# Patient Record
Sex: FEMALE | Race: WHITE | NOT HISPANIC OR LATINO | Employment: OTHER | URBAN - METROPOLITAN AREA
[De-identification: names, ages, dates, MRNs, and addresses within clinical notes are randomized per-mention and may not be internally consistent; named-entity substitution may affect disease eponyms.]

---

## 2015-02-25 LAB — CRC RECOMMENDATION EXT: NORMAL

## 2017-03-27 ENCOUNTER — HISTORICAL (OUTPATIENT)
Dept: LAB | Facility: HOSPITAL | Age: 67
End: 2017-03-27

## 2017-05-11 ENCOUNTER — HISTORICAL (OUTPATIENT)
Dept: ADMINISTRATIVE | Facility: HOSPITAL | Age: 67
End: 2017-05-11

## 2017-05-11 LAB
ABS NEUT (OLG): 2.23 X10(3)/MCL (ref 2.1–9.2)
BASOPHILS # BLD AUTO: 0 X10(3)/MCL (ref 0–0.2)
BASOPHILS NFR BLD AUTO: 1 %
EOSINOPHIL # BLD AUTO: 0.1 X10(3)/MCL (ref 0–0.9)
EOSINOPHIL NFR BLD AUTO: 2.4 %
ERYTHROCYTE [DISTWIDTH] IN BLOOD BY AUTOMATED COUNT: 13.7 % (ref 11.5–17)
HCT VFR BLD AUTO: 43.9 % (ref 37–47)
HGB BLD-MCNC: 14.6 GM/DL (ref 12–16)
LYMPHOCYTES # BLD AUTO: 1.3 X10(3)/MCL (ref 0.6–4.6)
LYMPHOCYTES NFR BLD AUTO: 31 %
MCH RBC QN AUTO: 29.1 PG (ref 27–31)
MCHC RBC AUTO-ENTMCNC: 33.3 GM/DL (ref 33–36)
MCV RBC AUTO: 87.6 FL (ref 80–94)
MONOCYTES # BLD AUTO: 0.5 X10(3)/MCL (ref 0.1–1.3)
MONOCYTES NFR BLD AUTO: 11.6 %
NEUTROPHILS # BLD AUTO: 2.2 X10(3)/MCL (ref 2.1–9.2)
NEUTROPHILS NFR BLD AUTO: 54 %
PLATELET # BLD AUTO: 219 X10(3)/MCL (ref 130–400)
PMV BLD AUTO: 11 FL (ref 9.4–12.4)
RBC # BLD AUTO: 5.01 X10(6)/MCL (ref 4.2–5.4)
WBC # SPEC AUTO: 4.1 X10(3)/MCL (ref 4.5–11.5)

## 2017-09-21 ENCOUNTER — HISTORICAL (OUTPATIENT)
Dept: LAB | Facility: HOSPITAL | Age: 67
End: 2017-09-21

## 2017-09-21 LAB
ABS NEUT (OLG): 1.86 X10(3)/MCL (ref 2.1–9.2)
ACANTHOCYTES (OLG): 0
ALBUMIN SERPL-MCNC: 4.4 GM/DL (ref 3.4–5)
ALBUMIN/GLOB SERPL: 1.6 RATIO (ref 1.1–2)
ALP SERPL-CCNC: 63 UNIT/L (ref 38–126)
ALT SERPL-CCNC: 27 UNIT/L (ref 12–78)
AST SERPL-CCNC: 16 UNIT/L (ref 15–37)
BASOPHILS NFR BLD MANUAL: 1 % (ref 0–2)
BILIRUB SERPL-MCNC: 0.5 MG/DL (ref 0.2–1)
BILIRUBIN DIRECT+TOT PNL SERPL-MCNC: 0.1 MG/DL (ref 0–0.5)
BILIRUBIN DIRECT+TOT PNL SERPL-MCNC: 0.4 MG/DL (ref 0–0.8)
BUN SERPL-MCNC: 10 MG/DL (ref 7–18)
BURR CELLS BLD QL SMEAR: 0
CALCIUM SERPL-MCNC: 9.3 MG/DL (ref 8.5–10.1)
CHLORIDE SERPL-SCNC: 107 MMOL/L (ref 98–107)
CHOLEST SERPL-MCNC: 176 MG/DL (ref 0–200)
CHOLEST/HDLC SERPL: 3.2 {RATIO} (ref 0–4)
CO2 SERPL-SCNC: 24 MMOL/L (ref 21–32)
CREAT SERPL-MCNC: 0.67 MG/DL (ref 0.55–1.02)
EOSINOPHIL NFR BLD MANUAL: 3 % (ref 0–8)
ERYTHROCYTE [DISTWIDTH] IN BLOOD BY AUTOMATED COUNT: 13.3 % (ref 11.5–17)
GLOBULIN SER-MCNC: 2.7 GM/DL (ref 2.4–3.5)
GLUCOSE SERPL-MCNC: 80 MG/DL (ref 74–106)
HCT VFR BLD AUTO: 45.4 % (ref 37–47)
HDLC SERPL-MCNC: 55 MG/DL (ref 35–60)
HGB BLD-MCNC: 15.7 GM/DL (ref 12–16)
LDLC SERPL CALC-MCNC: 90 MG/DL (ref 0–129)
LYMPHOCYTES NFR BLD MANUAL: 14 %
LYMPHOCYTES NFR BLD MANUAL: 20 % (ref 13–40)
MCH RBC QN AUTO: 30.3 PG (ref 27–31)
MCHC RBC AUTO-ENTMCNC: 34.6 GM/DL (ref 33–36)
MCV RBC AUTO: 87.6 FL (ref 80–94)
MONOCYTES NFR BLD MANUAL: 9 % (ref 2–11)
NEUTROPHILS NFR BLD MANUAL: 53 % (ref 47–80)
OVALOCYTES BLD QL SMEAR: 0
PLATELET # BLD AUTO: 219 X10(3)/MCL (ref 130–400)
PLATELET # BLD EST: NORMAL 10*3/UL
PMV BLD AUTO: 11.3 FL (ref 7.4–10.4)
POTASSIUM SERPL-SCNC: 4.1 MMOL/L (ref 3.5–5.1)
PROT SERPL-MCNC: 7.1 GM/DL (ref 6.4–8.2)
RBC # BLD AUTO: 5.18 X10(6)/MCL (ref 4.2–5.4)
SODIUM SERPL-SCNC: 142 MMOL/L (ref 136–145)
TRIGL SERPL-MCNC: 156 MG/DL (ref 30–150)
VLDLC SERPL CALC-MCNC: 31 MG/DL
WBC # SPEC AUTO: 3.6 X10(3)/MCL (ref 4.5–11.5)

## 2018-03-21 ENCOUNTER — HISTORICAL (OUTPATIENT)
Dept: LAB | Facility: HOSPITAL | Age: 68
End: 2018-03-21

## 2018-03-21 LAB
ABS NEUT (OLG): 1.93 X10(3)/MCL (ref 2.1–9.2)
ALBUMIN SERPL-MCNC: 4.3 GM/DL (ref 3.4–5)
ALBUMIN/GLOB SERPL: 1.6 {RATIO}
ALP SERPL-CCNC: 64 UNIT/L (ref 38–126)
ALT SERPL-CCNC: 25 UNIT/L (ref 12–78)
ANISOCYTOSIS BLD QL SMEAR: 1
AST SERPL-CCNC: 10 UNIT/L (ref 15–37)
BASOPHILS NFR BLD MANUAL: 3 % (ref 0–2)
BILIRUB SERPL-MCNC: 0.5 MG/DL (ref 0.2–1)
BILIRUBIN DIRECT+TOT PNL SERPL-MCNC: 0.1 MG/DL (ref 0–0.2)
BILIRUBIN DIRECT+TOT PNL SERPL-MCNC: 0.4 MG/DL (ref 0–0.8)
BUN SERPL-MCNC: 10 MG/DL (ref 7–18)
CALCIUM SERPL-MCNC: 9 MG/DL (ref 8.5–10.1)
CHLORIDE SERPL-SCNC: 107 MMOL/L (ref 98–107)
CHOLEST SERPL-MCNC: 175 MG/DL (ref 0–200)
CHOLEST/HDLC SERPL: 2.9 {RATIO} (ref 0–4)
CO2 SERPL-SCNC: 25 MMOL/L (ref 21–32)
CREAT SERPL-MCNC: 0.56 MG/DL (ref 0.55–1.02)
EOSINOPHIL NFR BLD MANUAL: 4 % (ref 0–8)
ERYTHROCYTE [DISTWIDTH] IN BLOOD BY AUTOMATED COUNT: 13.3 % (ref 11.5–17)
GLOBULIN SER-MCNC: 2.7 GM/DL (ref 2.4–3.5)
GLUCOSE SERPL-MCNC: 88 MG/DL (ref 74–106)
HCT VFR BLD AUTO: 44.4 % (ref 37–47)
HDLC SERPL-MCNC: 61 MG/DL (ref 35–60)
HGB BLD-MCNC: 15 GM/DL (ref 12–16)
LDLC SERPL CALC-MCNC: 89 MG/DL (ref 0–129)
LYMPHOCYTES NFR BLD MANUAL: 35 % (ref 13–40)
MCH RBC QN AUTO: 28.8 PG (ref 27–31)
MCHC RBC AUTO-ENTMCNC: 33.8 GM/DL (ref 33–36)
MCV RBC AUTO: 85.4 FL (ref 80–94)
MICROCYTES BLD QL SMEAR: 1
MONOCYTES NFR BLD MANUAL: 5 % (ref 2–11)
NEUTROPHILS NFR BLD MANUAL: 53 % (ref 47–80)
PLATELET # BLD AUTO: 236 X10(3)/MCL (ref 130–400)
PLATELET # BLD EST: NORMAL 10*3/UL
PMV BLD AUTO: 11.4 FL (ref 7.4–10.4)
POTASSIUM SERPL-SCNC: 3.9 MMOL/L (ref 3.5–5.1)
PROT SERPL-MCNC: 7 GM/DL (ref 6.4–8.2)
RBC # BLD AUTO: 5.2 X10(6)/MCL (ref 4.2–5.4)
SODIUM SERPL-SCNC: 139 MMOL/L (ref 136–145)
TRIGL SERPL-MCNC: 124 MG/DL (ref 30–150)
VLDLC SERPL CALC-MCNC: 25 MG/DL
WBC # SPEC AUTO: 3.9 X10(3)/MCL (ref 4.5–11.5)

## 2018-09-21 ENCOUNTER — HISTORICAL (OUTPATIENT)
Dept: LAB | Facility: HOSPITAL | Age: 68
End: 2018-09-21

## 2018-09-21 LAB
ABS NEUT (OLG): 1.89 X10(3)/MCL (ref 2.1–9.2)
BASOPHILS # BLD AUTO: 0 X10(3)/MCL (ref 0–0.2)
BASOPHILS NFR BLD AUTO: 1 %
BUN SERPL-MCNC: 11 MG/DL (ref 7–18)
CALCIUM SERPL-MCNC: 9.3 MG/DL (ref 8.5–10.1)
CHLORIDE SERPL-SCNC: 107 MMOL/L (ref 98–107)
CHOLEST SERPL-MCNC: 157 MG/DL (ref 0–200)
CHOLEST/HDLC SERPL: 2.6 {RATIO} (ref 0–4)
CO2 SERPL-SCNC: 31 MMOL/L (ref 21–32)
CREAT SERPL-MCNC: 0.68 MG/DL (ref 0.55–1.02)
CREAT/UREA NIT SERPL: 16.2
EOSINOPHIL # BLD AUTO: 0.1 X10(3)/MCL (ref 0–0.9)
EOSINOPHIL NFR BLD AUTO: 2 %
ERYTHROCYTE [DISTWIDTH] IN BLOOD BY AUTOMATED COUNT: 13.7 % (ref 11.5–17)
GLUCOSE SERPL-MCNC: 85 MG/DL (ref 74–106)
HCT VFR BLD AUTO: 46.9 % (ref 37–47)
HDLC SERPL-MCNC: 60 MG/DL (ref 35–60)
HGB BLD-MCNC: 15.3 GM/DL (ref 12–16)
LDLC SERPL CALC-MCNC: 75 MG/DL (ref 0–129)
LYMPHOCYTES # BLD AUTO: 1.3 X10(3)/MCL (ref 0.6–4.6)
LYMPHOCYTES NFR BLD AUTO: 35 %
MCH RBC QN AUTO: 29.3 PG (ref 27–31)
MCHC RBC AUTO-ENTMCNC: 32.6 GM/DL (ref 33–36)
MCV RBC AUTO: 89.8 FL (ref 80–94)
MONOCYTES # BLD AUTO: 0.4 X10(3)/MCL (ref 0.1–1.3)
MONOCYTES NFR BLD AUTO: 12 %
NEUTROPHILS # BLD AUTO: 1.89 X10(3)/MCL (ref 1.4–7.9)
NEUTROPHILS NFR BLD AUTO: 50 %
PLATELET # BLD AUTO: 218 X10(3)/MCL (ref 130–400)
PMV BLD AUTO: 11.2 FL (ref 9.4–12.4)
POTASSIUM SERPL-SCNC: 4.2 MMOL/L (ref 3.5–5.1)
RBC # BLD AUTO: 5.22 X10(6)/MCL (ref 4.2–5.4)
SODIUM SERPL-SCNC: 143 MMOL/L (ref 136–145)
TRIGL SERPL-MCNC: 110 MG/DL (ref 30–150)
VLDLC SERPL CALC-MCNC: 22 MG/DL
WBC # SPEC AUTO: 3.8 X10(3)/MCL (ref 4.5–11.5)

## 2019-09-23 ENCOUNTER — HISTORICAL (OUTPATIENT)
Dept: LAB | Facility: HOSPITAL | Age: 69
End: 2019-09-23

## 2019-09-23 LAB
ABS NEUT (OLG): 4.28 X10(3)/MCL (ref 2.1–9.2)
ALBUMIN SERPL-MCNC: 4.2 GM/DL (ref 3.4–5)
ALBUMIN/GLOB SERPL: 1.6 RATIO (ref 1.1–2)
ALP SERPL-CCNC: 76 UNIT/L (ref 38–126)
ALT SERPL-CCNC: 21 UNIT/L (ref 12–78)
AST SERPL-CCNC: 13 UNIT/L (ref 15–37)
BASOPHILS # BLD AUTO: 0.1 X10(3)/MCL (ref 0–0.2)
BASOPHILS NFR BLD AUTO: 1 %
BILIRUB SERPL-MCNC: 0.5 MG/DL (ref 0.2–1)
BILIRUBIN DIRECT+TOT PNL SERPL-MCNC: 0.1 MG/DL (ref 0–0.5)
BILIRUBIN DIRECT+TOT PNL SERPL-MCNC: 0.4 MG/DL (ref 0–0.8)
BUN SERPL-MCNC: 10 MG/DL (ref 7–18)
CALCIUM SERPL-MCNC: 8.7 MG/DL (ref 8.5–10.1)
CHLORIDE SERPL-SCNC: 107 MMOL/L (ref 98–107)
CHOLEST SERPL-MCNC: 145 MG/DL (ref 0–200)
CHOLEST/HDLC SERPL: 2.5 {RATIO} (ref 0–4)
CO2 SERPL-SCNC: 27 MMOL/L (ref 21–32)
CREAT SERPL-MCNC: 0.6 MG/DL (ref 0.55–1.02)
EOSINOPHIL # BLD AUTO: 0.1 X10(3)/MCL (ref 0–0.9)
EOSINOPHIL NFR BLD AUTO: 2 %
ERYTHROCYTE [DISTWIDTH] IN BLOOD BY AUTOMATED COUNT: 13.1 % (ref 11.5–17)
GLOBULIN SER-MCNC: 2.6 GM/DL (ref 2.4–3.5)
GLUCOSE SERPL-MCNC: 74 MG/DL (ref 74–106)
HCT VFR BLD AUTO: 45.9 % (ref 37–47)
HDLC SERPL-MCNC: 59 MG/DL (ref 35–60)
HGB BLD-MCNC: 14.5 GM/DL (ref 12–16)
LDLC SERPL CALC-MCNC: 64 MG/DL (ref 0–129)
LYMPHOCYTES # BLD AUTO: 1.5 X10(3)/MCL (ref 0.6–4.6)
LYMPHOCYTES NFR BLD AUTO: 23 %
MCH RBC QN AUTO: 28.2 PG (ref 27–31)
MCHC RBC AUTO-ENTMCNC: 31.6 GM/DL (ref 33–36)
MCV RBC AUTO: 89.1 FL (ref 80–94)
MONOCYTES # BLD AUTO: 0.5 X10(3)/MCL (ref 0.1–1.3)
MONOCYTES NFR BLD AUTO: 8 %
NEUTROPHILS # BLD AUTO: 4.28 X10(3)/MCL (ref 2.1–9.2)
NEUTROPHILS NFR BLD AUTO: 65 %
PLATELET # BLD AUTO: 212 X10(3)/MCL (ref 130–400)
PMV BLD AUTO: 12.1 FL (ref 9.4–12.4)
POTASSIUM SERPL-SCNC: 4 MMOL/L (ref 3.5–5.1)
PROT SERPL-MCNC: 6.8 GM/DL (ref 6.4–8.2)
RBC # BLD AUTO: 5.15 X10(6)/MCL (ref 4.2–5.4)
SODIUM SERPL-SCNC: 141 MMOL/L (ref 136–145)
TRIGL SERPL-MCNC: 108 MG/DL (ref 30–150)
VLDLC SERPL CALC-MCNC: 22 MG/DL
WBC # SPEC AUTO: 6.6 X10(3)/MCL (ref 4.5–11.5)

## 2020-09-28 ENCOUNTER — HISTORICAL (OUTPATIENT)
Dept: ADMINISTRATIVE | Facility: HOSPITAL | Age: 70
End: 2020-09-28

## 2020-09-28 LAB
ABS NEUT (OLG): 1.8 X10(3)/MCL (ref 2.1–9.2)
ALBUMIN SERPL-MCNC: 4.4 GM/DL (ref 3.4–5)
ALBUMIN/GLOB SERPL: 2 RATIO (ref 1.1–2)
ALP SERPL-CCNC: 63 UNIT/L (ref 40–150)
ALT SERPL-CCNC: 17 UNIT/L (ref 0–55)
AST SERPL-CCNC: 17 UNIT/L (ref 5–34)
BASOPHILS # BLD AUTO: 0 X10(3)/MCL (ref 0–0.2)
BASOPHILS NFR BLD AUTO: 1 %
BILIRUB SERPL-MCNC: 0.6 MG/DL
BILIRUBIN DIRECT+TOT PNL SERPL-MCNC: 0.2 MG/DL (ref 0–0.5)
BILIRUBIN DIRECT+TOT PNL SERPL-MCNC: 0.4 MG/DL (ref 0–0.8)
BUN SERPL-MCNC: 9.5 MG/DL (ref 9.8–20.1)
CALCIUM SERPL-MCNC: 8.9 MG/DL (ref 8.4–10.2)
CHLORIDE SERPL-SCNC: 106 MMOL/L (ref 98–107)
CHOLEST SERPL-MCNC: 146 MG/DL
CHOLEST/HDLC SERPL: 3 {RATIO} (ref 0–5)
CO2 SERPL-SCNC: 29 MMOL/L (ref 23–31)
CREAT SERPL-MCNC: 0.77 MG/DL (ref 0.55–1.02)
EOSINOPHIL # BLD AUTO: 0.1 X10(3)/MCL (ref 0–0.9)
EOSINOPHIL NFR BLD AUTO: 3 %
ERYTHROCYTE [DISTWIDTH] IN BLOOD BY AUTOMATED COUNT: 13.3 % (ref 11.5–17)
EST. AVERAGE GLUCOSE BLD GHB EST-MCNC: 114 MG/DL
GLOBULIN SER-MCNC: 2.2 GM/DL (ref 2.4–3.5)
GLUCOSE SERPL-MCNC: 77 MG/DL (ref 82–115)
HBA1C MFR BLD: 5.6 %
HCT VFR BLD AUTO: 43.8 % (ref 37–47)
HDLC SERPL-MCNC: 57 MG/DL (ref 35–60)
HGB BLD-MCNC: 14.3 GM/DL (ref 12–16)
LDLC SERPL CALC-MCNC: 73 MG/DL (ref 50–140)
LYMPHOCYTES # BLD AUTO: 1.4 X10(3)/MCL (ref 0.6–4.6)
LYMPHOCYTES NFR BLD AUTO: 38 %
MCH RBC QN AUTO: 28.7 PG (ref 27–31)
MCHC RBC AUTO-ENTMCNC: 32.6 GM/DL (ref 33–36)
MCV RBC AUTO: 87.8 FL (ref 80–94)
MONOCYTES # BLD AUTO: 0.4 X10(3)/MCL (ref 0.1–1.3)
MONOCYTES NFR BLD AUTO: 10 %
NEUTROPHILS # BLD AUTO: 1.8 X10(3)/MCL (ref 2.1–9.2)
NEUTROPHILS NFR BLD AUTO: 48 %
PLATELET # BLD AUTO: 207 X10(3)/MCL (ref 130–400)
PMV BLD AUTO: 11.8 FL (ref 9.4–12.4)
POTASSIUM SERPL-SCNC: 4.3 MMOL/L (ref 3.5–5.1)
PROT SERPL-MCNC: 6.6 GM/DL (ref 5.8–7.6)
RBC # BLD AUTO: 4.99 X10(6)/MCL (ref 4.2–5.4)
SODIUM SERPL-SCNC: 141 MMOL/L (ref 136–145)
TRIGL SERPL-MCNC: 79 MG/DL (ref 37–140)
TSH SERPL-ACNC: 1.79 UIU/ML (ref 0.35–4.94)
VLDLC SERPL CALC-MCNC: 16 MG/DL
WBC # SPEC AUTO: 3.7 X10(3)/MCL (ref 4.5–11.5)

## 2021-10-12 ENCOUNTER — HISTORICAL (OUTPATIENT)
Dept: ADMINISTRATIVE | Facility: HOSPITAL | Age: 71
End: 2021-10-12

## 2021-10-12 LAB
ABS NEUT (OLG): 2.25 X10(3)/MCL (ref 2.1–9.2)
ALBUMIN SERPL-MCNC: 4.4 GM/DL (ref 3.4–4.8)
ALBUMIN/GLOB SERPL: 1.8 RATIO (ref 1.1–2)
ALP SERPL-CCNC: 58 UNIT/L (ref 40–150)
ALT SERPL-CCNC: 16 UNIT/L (ref 0–55)
AST SERPL-CCNC: 18 UNIT/L (ref 5–34)
BASOPHILS # BLD AUTO: 0 X10(3)/MCL (ref 0–0.2)
BASOPHILS NFR BLD AUTO: 1 %
BILIRUB SERPL-MCNC: 0.7 MG/DL
BILIRUBIN DIRECT+TOT PNL SERPL-MCNC: 0.3 MG/DL (ref 0–0.5)
BILIRUBIN DIRECT+TOT PNL SERPL-MCNC: 0.4 MG/DL (ref 0–0.8)
BUN SERPL-MCNC: 9.3 MG/DL (ref 9.8–20.1)
CALCIUM SERPL-MCNC: 9.6 MG/DL (ref 8.4–10.2)
CHLORIDE SERPL-SCNC: 106 MMOL/L (ref 98–107)
CHOLEST SERPL-MCNC: 154 MG/DL
CHOLEST/HDLC SERPL: 3 {RATIO} (ref 0–5)
CO2 SERPL-SCNC: 30 MMOL/L (ref 23–31)
CREAT SERPL-MCNC: 0.77 MG/DL (ref 0.55–1.02)
EOSINOPHIL # BLD AUTO: 0.1 X10(3)/MCL (ref 0–0.9)
EOSINOPHIL NFR BLD AUTO: 3 %
ERYTHROCYTE [DISTWIDTH] IN BLOOD BY AUTOMATED COUNT: 13.3 % (ref 11.5–17)
EST. AVERAGE GLUCOSE BLD GHB EST-MCNC: 105.4 MG/DL
GLOBULIN SER-MCNC: 2.5 GM/DL (ref 2.4–3.5)
GLUCOSE SERPL-MCNC: 74 MG/DL (ref 82–115)
HBA1C MFR BLD: 5.3 %
HCT VFR BLD AUTO: 47.8 % (ref 37–47)
HDLC SERPL-MCNC: 53 MG/DL (ref 35–60)
HGB BLD-MCNC: 15.1 GM/DL (ref 12–16)
LDLC SERPL CALC-MCNC: 81 MG/DL (ref 50–140)
LYMPHOCYTES # BLD AUTO: 1.6 X10(3)/MCL (ref 0.6–4.6)
LYMPHOCYTES NFR BLD AUTO: 35 %
MCH RBC QN AUTO: 28.1 PG (ref 27–31)
MCHC RBC AUTO-ENTMCNC: 31.6 GM/DL (ref 33–36)
MCV RBC AUTO: 89 FL (ref 80–94)
MONOCYTES # BLD AUTO: 0.5 X10(3)/MCL (ref 0.1–1.3)
MONOCYTES NFR BLD AUTO: 10 %
NEUTROPHILS # BLD AUTO: 2.25 X10(3)/MCL (ref 2.1–9.2)
NEUTROPHILS NFR BLD AUTO: 50 %
PLATELET # BLD AUTO: 253 X10(3)/MCL (ref 130–400)
PMV BLD AUTO: 12 FL (ref 9.4–12.4)
POTASSIUM SERPL-SCNC: 3.7 MMOL/L (ref 3.5–5.1)
PROT SERPL-MCNC: 6.9 GM/DL (ref 5.8–7.6)
RBC # BLD AUTO: 5.37 X10(6)/MCL (ref 4.2–5.4)
SODIUM SERPL-SCNC: 142 MMOL/L (ref 136–145)
TRIGL SERPL-MCNC: 100 MG/DL (ref 37–140)
TSH SERPL-ACNC: 1.22 UIU/ML (ref 0.35–4.94)
VLDLC SERPL CALC-MCNC: 20 MG/DL
WBC # SPEC AUTO: 4.5 X10(3)/MCL (ref 4.5–11.5)

## 2021-10-19 LAB — BCS RECOMMENDATION EXT: NORMAL

## 2021-11-04 LAB
BILIRUB SERPL-MCNC: NORMAL MG/DL
BLOOD URINE, POC: NORMAL
CLARITY, POC UA: NORMAL
COLOR, POC UA: NORMAL
GLUCOSE UR QL STRIP: NEGATIVE
KETONES UR QL STRIP: NEGATIVE
LEUKOCYTE EST, POC UA: NORMAL
NITRITE, POC UA: POSITIVE
PH, POC UA: 5
PROTEIN, POC: NORMAL
SPECIFIC GRAVITY, POC UA: 1.01
UROBILINOGEN, POC UA: NORMAL

## 2021-11-16 ENCOUNTER — HISTORICAL (OUTPATIENT)
Dept: ADMINISTRATIVE | Facility: HOSPITAL | Age: 71
End: 2021-11-16

## 2021-11-16 LAB
APPEARANCE, UA: ABNORMAL
BACTERIA SPEC CULT: ABNORMAL /HPF
BILIRUB SERPL-MCNC: NEGATIVE MG/DL
BILIRUB UR QL STRIP: NEGATIVE
BLOOD URINE, POC: NORMAL
CLARITY, POC UA: NORMAL
COLOR UR: YELLOW
COLOR, POC UA: NORMAL
GLUCOSE (UA): NEGATIVE
GLUCOSE UR QL STRIP: NEGATIVE
HGB UR QL STRIP: ABNORMAL
KETONES UR QL STRIP: NEGATIVE
KETONES UR QL STRIP: NEGATIVE
LEUKOCYTE EST, POC UA: NORMAL
LEUKOCYTE ESTERASE UR QL STRIP: ABNORMAL
NITRITE UR QL STRIP: NEGATIVE
NITRITE, POC UA: NEGATIVE
PH UR STRIP: 8 [PH] (ref 5–9)
PH, POC UA: 8
PROT UR QL STRIP: NEGATIVE
PROTEIN, POC: NEGATIVE
RBC #/AREA URNS HPF: ABNORMAL /HPF
SP GR UR STRIP: 1 (ref 1–1.03)
SPECIFIC GRAVITY, POC UA: 1
SQUAMOUS EPITHELIAL, UA: ABNORMAL /HPF
UA WBC MAN: ABNORMAL /HPF
UROBILINOGEN UR STRIP-ACNC: 0.2
UROBILINOGEN, POC UA: NORMAL
WBC #/AREA URNS HPF: ABNORMAL /HPF (ref 0–3)

## 2022-04-10 ENCOUNTER — HISTORICAL (OUTPATIENT)
Dept: ADMINISTRATIVE | Facility: HOSPITAL | Age: 72
End: 2022-04-10

## 2022-04-26 VITALS
DIASTOLIC BLOOD PRESSURE: 75 MMHG | SYSTOLIC BLOOD PRESSURE: 107 MMHG | OXYGEN SATURATION: 97 % | HEIGHT: 63 IN | BODY MASS INDEX: 25.66 KG/M2 | WEIGHT: 144.81 LBS

## 2022-08-29 ENCOUNTER — OFFICE VISIT (OUTPATIENT)
Dept: URGENT CARE | Facility: CLINIC | Age: 72
End: 2022-08-29
Payer: MEDICARE

## 2022-08-29 ENCOUNTER — DOCUMENTATION ONLY (OUTPATIENT)
Dept: ADMINISTRATIVE | Facility: HOSPITAL | Age: 72
End: 2022-08-29

## 2022-08-29 ENCOUNTER — DOCUMENTATION ONLY (OUTPATIENT)
Dept: ADMINISTRATIVE | Facility: HOSPITAL | Age: 72
End: 2022-08-29
Payer: MEDICARE

## 2022-08-29 VITALS
DIASTOLIC BLOOD PRESSURE: 86 MMHG | HEART RATE: 81 BPM | TEMPERATURE: 99 F | RESPIRATION RATE: 18 BRPM | SYSTOLIC BLOOD PRESSURE: 130 MMHG | HEIGHT: 62 IN | WEIGHT: 144 LBS | BODY MASS INDEX: 26.5 KG/M2 | OXYGEN SATURATION: 97 %

## 2022-08-29 DIAGNOSIS — R30.9 PAIN WITH URINATION: Primary | ICD-10-CM

## 2022-08-29 DIAGNOSIS — R30.0 DYSURIA: ICD-10-CM

## 2022-08-29 PROBLEM — N39.0 URINARY TRACT INFECTION WITHOUT HEMATURIA: Status: ACTIVE | Noted: 2022-08-29

## 2022-08-29 LAB
APPEARANCE SNV: NORMAL
BILIRUB UR QL STRIP: NEGATIVE
COLOR UR: NORMAL
GLUCOSE UR QL STRIP: NEGATIVE
KETONES UR QL STRIP: NEGATIVE
LEUKOCYTE ESTERASE UR QL STRIP: NEGATIVE
PH, POC UA: 7
POC BLOOD, URINE: NEGATIVE
POC NITRATES, URINE: NEGATIVE
PROT UR QL STRIP: NEGATIVE
SP GR UR STRIP: 1 (ref 1–1.03)
UROBILINOGEN UR STRIP-ACNC: NORMAL (ref 0.1–1.1)

## 2022-08-29 PROCEDURE — 87088 URINE BACTERIA CULTURE: CPT | Performed by: PHYSICIAN ASSISTANT

## 2022-08-29 PROCEDURE — 99213 PR OFFICE/OUTPT VISIT, EST, LEVL III, 20-29 MIN: ICD-10-PCS | Mod: ,,, | Performed by: PHYSICIAN ASSISTANT

## 2022-08-29 PROCEDURE — 81003 URINALYSIS AUTO W/O SCOPE: CPT | Mod: QW,,, | Performed by: PHYSICIAN ASSISTANT

## 2022-08-29 PROCEDURE — 81003 POCT URINALYSIS, DIPSTICK, AUTOMATED, W/O SCOPE: ICD-10-PCS | Mod: QW,,, | Performed by: PHYSICIAN ASSISTANT

## 2022-08-29 PROCEDURE — 99213 OFFICE O/P EST LOW 20 MIN: CPT | Mod: ,,, | Performed by: PHYSICIAN ASSISTANT

## 2022-08-29 RX ORDER — ROSUVASTATIN CALCIUM 10 MG/1
10 TABLET, COATED ORAL
COMMUNITY
Start: 2021-11-29 | End: 2022-11-16 | Stop reason: SDUPTHER

## 2022-08-29 RX ORDER — CEFDINIR 300 MG/1
300 CAPSULE ORAL 2 TIMES DAILY
Qty: 10 CAPSULE | Refills: 0 | Status: SHIPPED | OUTPATIENT
Start: 2022-08-29 | End: 2022-09-03

## 2022-08-29 NOTE — PROGRESS NOTES
"Subjective:       Patient ID: Elisha Reynoso is a 71 y.o. female.    Vitals:  height is 5' 2" (1.575 m) and weight is 65.3 kg (144 lb). Her oral temperature is 98.6 °F (37 °C). Her blood pressure is 130/86 and her pulse is 81. Her respiration is 18 and oxygen saturation is 97%.     Chief Complaint: Urinary Tract Infection    Pt is a 71 yr old female that presents to clinic with pain with urination. Pt states that she took OTC Azo, and does not relieve sx.  Denies any fevers chills nausea vomiting or flank pain.  ROS    Objective:      Physical Exam   Constitutional: She is oriented to person, place, and time. She appears well-developed.   HENT:   Head: Normocephalic and atraumatic.   Ears:   Right Ear: External ear normal.   Left Ear: External ear normal.   Nose: Nose normal. No nasal deformity. No epistaxis.   Mouth/Throat: Oropharynx is clear and moist and mucous membranes are normal.   Eyes: Lids are normal.   Neck: Trachea normal and phonation normal. Neck supple.   Cardiovascular: Normal pulses.   Pulmonary/Chest: Effort normal.   Abdominal: Normal appearance and bowel sounds are normal. She exhibits no distension. Soft. There is no abdominal tenderness.   Neurological: She is alert and oriented to person, place, and time.   Skin: Skin is warm, dry and intact.   Psychiatric: Her speech is normal and behavior is normal.   Nursing note and vitals reviewed.           Previous History      Review of patient's allergies indicates:  No Known Allergies    Past Medical History:   Diagnosis Date    Breast cancer     Hyperlipidemia      Current Outpatient Medications   Medication Instructions    cefdinir (OMNICEF) 300 mg, Oral, 2 times daily    rosuvastatin (CRESTOR) 10 mg, Oral     Past Surgical History:   Procedure Laterality Date    BREAST SURGERY      COLONOSCOPY  02/25/2015    Tommy Sharma MD    HYSTERECTOMY       Family History   Family history unknown: Yes       Social History     Tobacco Use    Smoking " "status: Never    Smokeless tobacco: Never   Substance Use Topics    Alcohol use: Never    Drug use: Never        Physical Exam      Vital Signs Reviewed   /86   Pulse 81   Temp 98.6 °F (37 °C) (Oral)   Resp 18   Ht 5' 2" (1.575 m)   Wt 65.3 kg (144 lb)   SpO2 97%   BMI 26.34 kg/m²        Procedures    Procedures     Labs     Results for orders placed or performed in visit on 08/29/22   POCT Urinalysis, Dipstick, Automated, W/O Scope   Result Value Ref Range    POC Blood, Urine Negative Negative    POC Bilirubin, Urine Negative Negative    POC Urobilinogen, Urine NORMAL 0.1 - 1.1    POC Ketones, Urine Negative Negative    POC Protein, Urine Negative Negative    POC Nitrates, Urine Negative Negative    POC Glucose, Urine Negative Negative    pH, UA 7     POC Specific Gravity, Urine 1.005 1.003 - 1.029    POC Leukocytes, Urine Negative Negative    Color, UA Light Yellow Light Yellow, Yellow    Appearance CLOUDY          Assessment:       1. Pain with urination    2. Dysuria            Plan:         Pain with urination  -     POCT Urinalysis, Dipstick, Automated, W/O Scope    Dysuria  -     Urine culture  -     cefdinir (OMNICEF) 300 MG capsule; Take 1 capsule (300 mg total) by mouth 2 (two) times daily. for 5 days  Dispense: 10 capsule; Refill: 0       Drink plenty of fluids  Get plenty of rest    Follow up with your primary care doctor.  Go to the emergency department with any significant change or worsening of symptoms.              "

## 2022-08-30 ENCOUNTER — TELEPHONE (OUTPATIENT)
Dept: PRIMARY CARE CLINIC | Facility: CLINIC | Age: 72
End: 2022-08-30
Payer: MEDICARE

## 2022-08-30 NOTE — TELEPHONE ENCOUNTER
----- Message from Radha Arboleda LPN sent at 8/29/2022  4:50 PM CDT -----  Regarding: FW: med adv  Schedule her to come in so we can test urine for possible culture  ----- Message -----  From: Laura Price MA  Sent: 8/29/2022   4:13 PM CDT  To: Radha Arboleda LPN  Subject: FW: med adv                                      What you want to do, call her in meds or schedule appt?  ----- Message -----  From: Toña Valentino  Sent: 8/29/2022   1:06 PM CDT  To: Nathaniel Bernard Staff  Subject: med adv                                          Type:  Needs Medical Advice    Who Called: patient  Symptoms (please be specific): burning on urination and frequent uriination   How long has patient had these symptoms:  a week   Pharmacy name and phone #:  [facility]  Would the patient rather a call back or a response via [facility]?   Best Call Back Number: [phone]  Additional Information: Patient states she's had a home test and came positive for an UTI. Please call patient back.

## 2022-08-31 LAB — BACTERIA UR CULT: NORMAL

## 2022-09-21 ENCOUNTER — HISTORICAL (OUTPATIENT)
Dept: ADMINISTRATIVE | Facility: HOSPITAL | Age: 72
End: 2022-09-21
Payer: MEDICARE

## 2022-10-20 DIAGNOSIS — Z13.6 ENCOUNTER FOR SCREENING FOR CARDIOVASCULAR DISORDERS: ICD-10-CM

## 2022-10-20 DIAGNOSIS — Z00.00 ENCOUNTER FOR WELLNESS EXAMINATION: Primary | ICD-10-CM

## 2022-10-21 ENCOUNTER — CLINICAL SUPPORT (OUTPATIENT)
Dept: PRIMARY CARE CLINIC | Facility: CLINIC | Age: 72
End: 2022-10-21
Payer: MEDICARE

## 2022-10-21 DIAGNOSIS — Z00.00 ENCOUNTER FOR WELLNESS EXAMINATION: ICD-10-CM

## 2022-10-21 DIAGNOSIS — Z13.6 ENCOUNTER FOR SCREENING FOR CARDIOVASCULAR DISORDERS: ICD-10-CM

## 2022-10-21 LAB
ALBUMIN SERPL-MCNC: 4.4 GM/DL (ref 3.4–4.8)
ALBUMIN/GLOB SERPL: 1.8 RATIO (ref 1.1–2)
ALP SERPL-CCNC: 65 UNIT/L (ref 40–150)
ALT SERPL-CCNC: 17 UNIT/L (ref 0–55)
AST SERPL-CCNC: 16 UNIT/L (ref 5–34)
BASOPHILS # BLD AUTO: 0.06 X10(3)/MCL (ref 0–0.2)
BASOPHILS NFR BLD AUTO: 1.4 %
BILIRUBIN DIRECT+TOT PNL SERPL-MCNC: 0.7 MG/DL
BUN SERPL-MCNC: 10.8 MG/DL (ref 9.8–20.1)
CALCIUM SERPL-MCNC: 9.6 MG/DL (ref 8.4–10.2)
CHLORIDE SERPL-SCNC: 106 MMOL/L (ref 98–107)
CHOLEST SERPL-MCNC: 167 MG/DL
CHOLEST/HDLC SERPL: 3 {RATIO} (ref 0–5)
CO2 SERPL-SCNC: 28 MMOL/L (ref 23–31)
CREAT SERPL-MCNC: 0.72 MG/DL (ref 0.55–1.02)
EOSINOPHIL # BLD AUTO: 0.16 X10(3)/MCL (ref 0–0.9)
EOSINOPHIL NFR BLD AUTO: 3.8 %
ERYTHROCYTE [DISTWIDTH] IN BLOOD BY AUTOMATED COUNT: 13.4 % (ref 11.5–17)
EST. AVERAGE GLUCOSE BLD GHB EST-MCNC: 108.3 MG/DL
GFR SERPLBLD CREATININE-BSD FMLA CKD-EPI: >60 MLS/MIN/1.73/M2
GLOBULIN SER-MCNC: 2.5 GM/DL (ref 2.4–3.5)
GLUCOSE SERPL-MCNC: 87 MG/DL (ref 82–115)
HBA1C MFR BLD: 5.4 %
HCT VFR BLD AUTO: 45.5 % (ref 37–47)
HDLC SERPL-MCNC: 58 MG/DL (ref 35–60)
HGB BLD-MCNC: 15.2 GM/DL (ref 12–16)
IMM GRANULOCYTES # BLD AUTO: 0.01 X10(3)/MCL (ref 0–0.04)
IMM GRANULOCYTES NFR BLD AUTO: 0.2 %
LDLC SERPL CALC-MCNC: 93 MG/DL (ref 50–140)
LYMPHOCYTES # BLD AUTO: 1.55 X10(3)/MCL (ref 0.6–4.6)
LYMPHOCYTES NFR BLD AUTO: 36.9 %
MCH RBC QN AUTO: 29.4 PG (ref 27–31)
MCHC RBC AUTO-ENTMCNC: 33.4 MG/DL (ref 33–36)
MCV RBC AUTO: 88 FL (ref 80–94)
MONOCYTES # BLD AUTO: 0.47 X10(3)/MCL (ref 0.1–1.3)
MONOCYTES NFR BLD AUTO: 11.2 %
NEUTROPHILS # BLD AUTO: 2 X10(3)/MCL (ref 2.1–9.2)
NEUTROPHILS NFR BLD AUTO: 46.5 %
NRBC BLD AUTO-RTO: 0 %
PLATELET # BLD AUTO: 236 X10(3)/MCL (ref 130–400)
PMV BLD AUTO: 12.4 FL (ref 7.4–10.4)
POTASSIUM SERPL-SCNC: 3.9 MMOL/L (ref 3.5–5.1)
PROT SERPL-MCNC: 6.9 GM/DL (ref 5.8–7.6)
RBC # BLD AUTO: 5.17 X10(6)/MCL (ref 4.2–5.4)
SODIUM SERPL-SCNC: 142 MMOL/L (ref 136–145)
TRIGL SERPL-MCNC: 80 MG/DL (ref 37–140)
TSH SERPL-ACNC: 1.54 UIU/ML (ref 0.35–4.94)
VLDLC SERPL CALC-MCNC: 16 MG/DL
WBC # SPEC AUTO: 4.2 X10(3)/MCL (ref 4.5–11.5)

## 2022-10-21 PROCEDURE — 80061 LIPID PANEL: CPT | Performed by: GENERAL PRACTICE

## 2022-10-21 PROCEDURE — 36415 COLL VENOUS BLD VENIPUNCTURE: CPT

## 2022-10-21 PROCEDURE — 80053 COMPREHEN METABOLIC PANEL: CPT | Performed by: GENERAL PRACTICE

## 2022-10-21 PROCEDURE — 84443 ASSAY THYROID STIM HORMONE: CPT | Performed by: GENERAL PRACTICE

## 2022-10-21 PROCEDURE — 85025 COMPLETE CBC W/AUTO DIFF WBC: CPT | Performed by: GENERAL PRACTICE

## 2022-10-21 PROCEDURE — 83036 HEMOGLOBIN GLYCOSYLATED A1C: CPT | Performed by: GENERAL PRACTICE

## 2022-10-24 PROBLEM — R30.9 PAIN WITH URINATION: Status: RESOLVED | Noted: 2022-08-29 | Resolved: 2022-10-24

## 2022-10-24 PROBLEM — D72.819 LEUKOPENIA: Chronic | Status: ACTIVE | Noted: 2022-10-24

## 2022-10-24 PROBLEM — N39.0 URINARY TRACT INFECTION WITHOUT HEMATURIA: Status: RESOLVED | Noted: 2022-08-29 | Resolved: 2022-10-24

## 2022-10-24 PROBLEM — E78.5 HYPERLIPIDEMIA: Status: ACTIVE | Noted: 2022-10-24

## 2022-10-24 PROBLEM — M81.0 OSTEOPOROSIS: Status: ACTIVE | Noted: 2022-10-24

## 2022-10-24 PROBLEM — E78.5 DYSLIPIDEMIA: Chronic | Status: ACTIVE | Noted: 2022-10-24

## 2022-10-24 PROBLEM — D72.819 LEUKOPENIA: Status: ACTIVE | Noted: 2022-10-24

## 2022-10-24 PROBLEM — M81.0 OSTEOPOROSIS: Chronic | Status: ACTIVE | Noted: 2022-10-24

## 2022-10-24 PROBLEM — C50.919 MALIGNANT NEOPLASM OF BREAST: Status: ACTIVE | Noted: 2022-10-24

## 2022-10-24 NOTE — PROGRESS NOTES
Patient ID: [id number]     Chief Complaint: Annual Exam      HPI:     Elisha Reynoso is a 71 y.o. female here today for a Medicare Wellness. No other complaints today.       ----------------------------  Breast cancer  Hyperlipidemia     Past Surgical History:   Procedure Laterality Date    BREAST SURGERY      COLONOSCOPY  02/25/2015    Tommy Sharma MD    HYSTERECTOMY         Review of patient's allergies indicates:  No Known Allergies    Outpatient Medications Marked as Taking for the 10/25/22 encounter (Office Visit) with Marco Antonio Armstrong MD   Medication Sig Dispense Refill    co-enzyme Q-10 30 mg capsule Take 30 mg by mouth 3 (three) times daily.      rosuvastatin (CRESTOR) 10 MG tablet Take 10 mg by mouth.         Social History     Socioeconomic History    Marital status:    Tobacco Use    Smoking status: Never    Smokeless tobacco: Never   Substance and Sexual Activity    Alcohol use: Never    Drug use: Never        Family History   Family history unknown: Yes        Patient Care Team:  Marco Antonio Armstrong MD as PCP - General (Family Medicine)  Lila Ayala MD as Consulting Physician (Gynecology)  Breast [facility]  Tommy Sharma MD as Consulting Physician (Gastroenterology)     Opioid Screening: Patient medication list reviewed, patient is not taking prescription opioids. Patient is not using additional opioids than prescribed. Patient is at low risk of substance abuse based on this opioid use history.       Subjective:     Review of Systems   Constitutional: Negative.  Negative for malaise/fatigue and weight loss.   HENT: Negative.     Eyes: Negative.    Respiratory: Negative.  Negative for shortness of breath.    Cardiovascular: Negative.  Negative for chest pain.   Gastrointestinal: Negative.    Genitourinary: Negative.    Musculoskeletal: Negative.    Skin: Negative.    Neurological: Negative.  Negative for focal weakness, weakness and headaches.   Endo/Heme/Allergies:  Negative.    Psychiatric/Behavioral: Negative.  Negative for depression and memory loss. The patient is not nervous/anxious.        Patient Reported Health Risk Assessment  What [age] 70-79  Are you male or female?: Female  During the past four weeks, how much have you been bothered by emotional problems such as feeling anxious, depressed, irritable, sad, or downhearted and blue?: Not at all  During the past five weeks, has your physical and/or emotional health limited your social activities with family, friends, neighbors, or groups?: Not at all  During the past four weeks, how much bodily pain have you generally had?: No pain  During the past four weeks, was someone available to help if you needed and wanted help?: Yes, as much as I wanted  During the past four weeks, what was the hardest physical activity you could do for at least two minutes?: Very light  Can you get to places out of walking distance without help?  (For example, can you travel alone on buses or taxis, or drive your own car?): Yes  Can you go shopping for groceries or clothes without someone's help?: Yes  Can you prepare your own meals?: Yes  Can you do your own housework without help?: Yes  Because of any health problems, do you need the help of another person with your personal care needs such as eating, bathing, dressing, or getting around the house?: No  Can you handle your own money without help?: Yes  During the past four weeks, how would you rate your health in general?: Very good  How have things been going for you during the past four weeks?: Very well  Are you having difficulties driving your car?: No  Do you always fasten your seat belt when you are in a car?: Yes, usually  How often in the past four weeks have you been bothered by falling or dizzy when standing up?: Never  How often in the past four weeks have you been bothered by sexual problems?: Never  How often in the past four weeks have you been bothered by trouble  "eating well?: Never  How often in the past four weeks have you been bothered by teeth or denture problems?: Never  How often in the past four weeks have you been bothered with problems using the telephone?: Never  How often in the past four weeks have you been bothered by tiredness or fatigue?: Never  Have you fallen two or more times in the past year?: No  Are you afraid of falling?: No  Are you a smoker?: No  During the past four weeks, how many drinks of wine, beer, or other alcoholic beverages did you have?: No alcohol at all  Do you exercise for about 20 minutes three or more days a week?: No, I usually do not exercise this much  Have you been given any information to help you with hazards in your house that might hurt you?: No  Have you been given any information to help you with keeping track of your medications?: No  How often do you have trouble taking medicines the way you've been told to take them?: I always take them as prescribed  How confident are you that you can control and manage most of your health problems?: Very confident  What is your race? (Check all that apply.):     Objective:     /78   Pulse 82   Resp 18   Ht 5' 2" (1.575 m)   Wt 65.8 kg (145 lb)   SpO2 97%   BMI 26.52 kg/m²     Physical Exam  Constitutional:       Appearance: Normal appearance.   HENT:      Head: Normocephalic.      Mouth/Throat:      Pharynx: Oropharynx is clear.   Eyes:      Conjunctiva/sclera: Conjunctivae normal.      Pupils: Pupils are equal, round, and reactive to light.   Cardiovascular:      Rate and Rhythm: Normal rate and regular rhythm.      Heart sounds: Normal heart sounds.   Pulmonary:      Effort: Pulmonary effort is normal.      Breath sounds: Normal breath sounds.   Abdominal:      General: Abdomen is flat.      Palpations: Abdomen is soft.   Musculoskeletal:         General: Normal range of motion.      Cervical back: Neck supple.   Skin:     General: Skin is warm and dry. "   Neurological:      General: No focal deficit present.      Mental Status: She is oriented to person, place, and time.   Psychiatric:         Mood and Affect: Mood normal.         Behavior: Behavior normal.         Thought Content: Thought content normal.         Judgment: Judgment normal.       Lab Results   Component Value Date     10/21/2022    K 3.9 10/21/2022    CO2 28 10/21/2022    BUN 10.8 10/21/2022    CREATININE 0.72 10/21/2022    CALCIUM 9.6 10/21/2022    ALBUMIN 4.4 10/21/2022    BILITOT 0.7 10/21/2022    ALKPHOS 65 10/21/2022    AST 16 10/21/2022    ALT 17 10/21/2022    EGFRNONAA >60 10/12/2021     Lab Results   Component Value Date    WBC 4.2 (L) 10/21/2022    RBC 5.17 10/21/2022    HGB 15.2 10/21/2022    HCT 45.5 10/21/2022    MCV 88.0 10/21/2022    RDW 13.4 10/21/2022     10/21/2022      Lab Results   Component Value Date    CHOL 167 10/21/2022    TRIG 80 10/21/2022    HDL 58 10/21/2022    LDL 93.00 10/21/2022    TOTALCHOLEST 3 10/21/2022     Lab Results   Component Value Date    TSH 1.5423 10/21/2022     Lab Results   Component Value Date    HGBA1C 5.4 10/21/2022              No flowsheet data found.  Fall Risk Assessment - Outpatient 10/25/2022 8/29/2022   Mobility Status Ambulatory Ambulatory   Number of falls 0 0   Identified as fall risk 0 0           Depression Screening  Over the past two weeks, has the patient felt down, depressed, or hopeless?: No  Over the past two weeks, has the patient felt little interest or pleasure in doing things?: No  Functional Ability/Safety Screening  Was the patient's timed Up & Go test unsteady or longer than 30 seconds?: No  Does the patient need help with phone, transportation, shopping, preparing meals, housework, laundry, meds, or managing money?: No  Does the patient's home have rugs in the hallway, lack grab bars in the bathroom, lack handrails on the stairs or have poor lighting?: No  Have you noticed any hearing difficulties?: No  Cognitive  Function (Assessed through direct observation with due consideration of information obtained by way of patient reports and/or concerns raised by family, friends, caretakers, or others)    Does the patient repeat questions/statements in the same day?: No  Does the patient have trouble remembering the date, year, and time?: No  Does the patient have difficulty managing finances?: No  Does the patient have a decreased sense of direction?: No  Assessment:       ICD-10-CM ICD-9-CM   1. Medicare annual wellness visit, subsequent  Z00.00 V70.0   2. Dyslipidemia  E78.5 272.4   3. Leukopenia, unspecified type  D72.819 288.50   4. Abnormal blood chemistry  R79.9 790.6        Plan:     Medicare Annual Wellness and Personalized Prevention Plan:   Fall Risk + Home Safety + Hearing Impairment + Depression Screen + Cognitive Impairment Screen + Health Risk Assessment all reviewed.       Health Maintenance Topics with due status: Not Due       Topic Last Completion Date    Colorectal Cancer Screening 02/25/2015    DEXA Scan 03/09/2020    Lipid Panel 10/21/2022      The patient's Health Maintenance was reviewed and the following appears to be due at this time:   Health Maintenance Due   Topic Date Due    Hepatitis C Screening  Never done    TETANUS VACCINE  Never done    Shingles Vaccine (1 of 2) Never done    COVID-19 Vaccine (3 - Booster for Pfizer series) 05/24/2021    Influenza Vaccine (1) 09/01/2022    Mammogram  10/19/2022         1. Medicare annual wellness visit, subsequent  Comments:  Overall health status was reviewed.  Good health habits reinforced.  Annual wellness exams recommended.  Orders:  -     TSH; Future; Expected date: 10/25/2023  -     Hemoglobin A1C; Future; Expected date: 10/25/2023  -     Lipid Panel; Future; Expected date: 10/25/2023  -     Comprehensive Metabolic Panel; Future; Expected date: 10/25/2023  -     CBC Auto Differential; Future; Expected date: 10/25/2023  -     Hepatitis C Antibody; Future;  Expected date: 10/25/2023    2. Dyslipidemia  Assessment & Plan:  Cholesterol/lipid blood testing shows adequate control, continue current treatment plan, including prescription medications if prescribed, and/or diet high in fiber, low in saturated fats as discussed during clinic visit.    Orders:  -     Lipid Panel; Future; Expected date: 10/25/2023    3. Leukopenia, unspecified type  Assessment & Plan:  Stable condition, current WBC count 4.2.      Orders:  -     CBC Auto Differential; Future; Expected date: 10/25/2023    4. Abnormal blood chemistry  -     Hemoglobin A1C; Future; Expected date: 10/25/2023       Advance Care Planning   I attest to discussing Advance Care Planning with patient and/or family member.  Education was provided including the importance of the Health Care Power of , Advance Directives, and/or [place] documentation.  The patient expressed understanding to the importance of this information and discussion.           Medication List with Changes/Refills   Current Medications    ALENDRONATE (FOSAMAX) 70 MG TABLET    Take 70 mg by mouth.       Start Date: --        End Date: --    CO-ENZYME Q-10 30 MG CAPSULE    Take 30 mg by mouth 3 (three) times daily.       Start Date: --        End Date: --    ROSUVASTATIN (CRESTOR) 10 MG TABLET    Take 10 mg by mouth.       Start Date: 11/29/2021End Date: --    SOY ISOFLA/BLK COHOSH/MAG BARK (ESTROVEN ORAL)    Take by mouth Daily.       Start Date: --        End Date: --        Follow up in about 1 year (around 10/25/2023) for Medicare Wellness. In addition to their scheduled follow up, the patient has also been instructed to follow up on as needed basis.

## 2022-10-25 ENCOUNTER — OFFICE VISIT (OUTPATIENT)
Dept: PRIMARY CARE CLINIC | Facility: CLINIC | Age: 72
End: 2022-10-25
Payer: MEDICARE

## 2022-10-25 VITALS
RESPIRATION RATE: 18 BRPM | HEIGHT: 62 IN | BODY MASS INDEX: 26.68 KG/M2 | OXYGEN SATURATION: 97 % | HEART RATE: 82 BPM | SYSTOLIC BLOOD PRESSURE: 115 MMHG | DIASTOLIC BLOOD PRESSURE: 78 MMHG | WEIGHT: 145 LBS

## 2022-10-25 DIAGNOSIS — E78.5 DYSLIPIDEMIA: Chronic | ICD-10-CM

## 2022-10-25 DIAGNOSIS — D72.819 LEUKOPENIA, UNSPECIFIED TYPE: Chronic | ICD-10-CM

## 2022-10-25 DIAGNOSIS — R79.9 ABNORMAL BLOOD CHEMISTRY: ICD-10-CM

## 2022-10-25 DIAGNOSIS — Z00.00 MEDICARE ANNUAL WELLNESS VISIT, SUBSEQUENT: Primary | ICD-10-CM

## 2022-10-25 PROCEDURE — G0439 PPPS, SUBSEQ VISIT: HCPCS | Mod: ,,, | Performed by: GENERAL PRACTICE

## 2022-10-25 PROCEDURE — G0439 PR MEDICARE ANNUAL WELLNESS SUBSEQUENT VISIT: ICD-10-PCS | Mod: ,,, | Performed by: GENERAL PRACTICE

## 2022-10-25 RX ORDER — ALENDRONATE SODIUM 70 MG/1
70 TABLET ORAL
COMMUNITY
End: 2023-07-06 | Stop reason: SINTOL

## 2022-10-25 RX ORDER — UBIDECARENONE 30 MG
30 CAPSULE ORAL 3 TIMES DAILY
COMMUNITY

## 2022-10-26 LAB — BCS RECOMMENDATION EXT: NORMAL

## 2022-11-16 ENCOUNTER — TELEPHONE (OUTPATIENT)
Dept: FAMILY MEDICINE | Facility: CLINIC | Age: 72
End: 2022-11-16
Payer: MEDICARE

## 2022-11-16 DIAGNOSIS — E78.5 DYSLIPIDEMIA: Primary | Chronic | ICD-10-CM

## 2022-11-16 RX ORDER — ROSUVASTATIN CALCIUM 10 MG/1
10 TABLET, COATED ORAL DAILY
Qty: 90 TABLET | Refills: 3 | Status: SHIPPED | OUTPATIENT
Start: 2022-11-16 | End: 2023-11-16 | Stop reason: SDUPTHER

## 2022-11-16 NOTE — TELEPHONE ENCOUNTER
----- Message from Uziel Sher sent at 11/16/2022  2:54 PM CST -----  Regarding: Rx Request  Type:  RX Refill Request    Who Called:  pt  Refill or New Rx: refill  RX Name and Strength: rosuvastatin (CRESTOR) 10 MG tablet  How is the patient currently taking it? (ex. 1XDay): once daily  Is this a 30 day or 90 day RX: 30  Preferred Pharmacy with phone number: [facility]  Local or Mail Order: local  Ordering Provider: Dr. Armstrong  Would the patient rather a call back or a response via [facility]? na  Best Call Back Number: mobile  Additional Information: na

## 2022-11-17 ENCOUNTER — DOCUMENTATION ONLY (OUTPATIENT)
Dept: PRIMARY CARE CLINIC | Facility: CLINIC | Age: 72
End: 2022-11-17
Payer: MEDICARE

## 2022-11-18 ENCOUNTER — DOCUMENTATION ONLY (OUTPATIENT)
Dept: PRIMARY CARE CLINIC | Facility: CLINIC | Age: 72
End: 2022-11-18
Payer: MEDICARE

## 2023-06-29 ENCOUNTER — TELEPHONE (OUTPATIENT)
Dept: PRIMARY CARE CLINIC | Facility: CLINIC | Age: 73
End: 2023-06-29
Payer: MEDICARE

## 2023-07-06 ENCOUNTER — OFFICE VISIT (OUTPATIENT)
Dept: PRIMARY CARE CLINIC | Facility: CLINIC | Age: 73
End: 2023-07-06
Payer: MEDICARE

## 2023-07-06 VITALS
RESPIRATION RATE: 18 BRPM | DIASTOLIC BLOOD PRESSURE: 83 MMHG | WEIGHT: 146 LBS | HEART RATE: 71 BPM | OXYGEN SATURATION: 98 % | SYSTOLIC BLOOD PRESSURE: 127 MMHG | HEIGHT: 62 IN | BODY MASS INDEX: 26.87 KG/M2

## 2023-07-06 DIAGNOSIS — Z01.818 PREOPERATIVE CLEARANCE: ICD-10-CM

## 2023-07-06 DIAGNOSIS — H25.9 SENILE CATARACT OF RIGHT EYE, UNSPECIFIED AGE-RELATED CATARACT TYPE: ICD-10-CM

## 2023-07-06 PROBLEM — C50.919 MALIGNANT NEOPLASM OF BREAST: Status: RESOLVED | Noted: 2022-10-24 | Resolved: 2023-07-06

## 2023-07-06 PROCEDURE — 99213 PR OFFICE/OUTPT VISIT, EST, LEVL III, 20-29 MIN: ICD-10-PCS | Mod: ,,, | Performed by: GENERAL PRACTICE

## 2023-07-06 PROCEDURE — 99213 OFFICE O/P EST LOW 20 MIN: CPT | Mod: ,,, | Performed by: GENERAL PRACTICE

## 2023-07-06 RX ORDER — AMOXICILLIN 500 MG
CAPSULE ORAL DAILY
COMMUNITY

## 2023-07-06 RX ORDER — 1.1% SODIUM FLUORIDE PRESCRIPTION DENTAL CREAM 5 MG/G
CREAM DENTAL 2 TIMES DAILY
COMMUNITY
Start: 2023-05-04

## 2023-07-06 NOTE — PROGRESS NOTES
"Subjective:       Patient ID: Elisha Reynoso is a 72 y.o. female.    Chief Complaint: Surgical Clearance (Right Eye Cataract Surgery - 7-18-23 - Dr Kevin Heaton)    Established patient, presents to the clinic for the purpose of clearance for right cataract surgery.  Patient does not have any significant cardiopulmonary problems which would preclude her from this type of procedure.    Review of Systems   Constitutional: Negative.  Negative for fatigue and fever.   HENT: Negative.  Negative for sore throat and trouble swallowing.    Eyes: Negative.  Negative for redness and visual disturbance.   Respiratory: Negative.  Negative for chest tightness and shortness of breath.    Cardiovascular: Negative.  Negative for chest pain.   Gastrointestinal: Negative.  Negative for abdominal pain, blood in stool and nausea.   Endocrine: Negative.  Negative for cold intolerance, heat intolerance and polyuria.   Genitourinary: Negative.    Musculoskeletal: Negative.  Negative for arthralgias, gait problem and joint swelling.   Integumentary:  Negative for rash. Negative.   Neurological: Negative.  Negative for dizziness, weakness and headaches.   Psychiatric/Behavioral: Negative.  Negative for agitation and dysphoric mood.        Objective:   Visit Vitals  /83   Pulse 71   Resp 18   Ht 5' 2" (1.575 m)   Wt 66.2 kg (146 lb)   SpO2 98%   BMI 26.70 kg/m²        Physical Exam  Constitutional:       Appearance: Normal appearance.   HENT:      Head: Normocephalic.      Mouth/Throat:      Pharynx: Oropharynx is clear.   Eyes:      Conjunctiva/sclera: Conjunctivae normal.      Pupils: Pupils are equal, round, and reactive to light.   Cardiovascular:      Rate and Rhythm: Normal rate and regular rhythm.      Heart sounds: Normal heart sounds.   Pulmonary:      Effort: Pulmonary effort is normal.      Breath sounds: Normal breath sounds.   Abdominal:      General: Abdomen is flat.      Palpations: Abdomen is soft.   Musculoskeletal:   "       General: Normal range of motion.      Cervical back: Neck supple.   Skin:     General: Skin is warm and dry.   Neurological:      General: No focal deficit present.      Mental Status: She is oriented to person, place, and time.   Psychiatric:         Mood and Affect: Mood normal.         Behavior: Behavior normal.         Thought Content: Thought content normal.         Judgment: Judgment normal.         Lab Results   Component Value Date     10/21/2022    K 3.9 10/21/2022    CO2 28 10/21/2022    BUN 10.8 10/21/2022    CREATININE 0.72 10/21/2022    CALCIUM 9.6 10/21/2022    ALBUMIN 4.4 10/21/2022    BILITOT 0.7 10/21/2022    ALKPHOS 65 10/21/2022    AST 16 10/21/2022    ALT 17 10/21/2022    EGFRNORACEVR >60 10/21/2022     Lab Results   Component Value Date    WBC 4.2 (L) 10/21/2022    RBC 5.17 10/21/2022    HGB 15.2 10/21/2022    HCT 45.5 10/21/2022    MCV 88.0 10/21/2022    RDW 13.4 10/21/2022     10/21/2022      Lab Results   Component Value Date    CHOL 167 10/21/2022    TRIG 80 10/21/2022    HDL 58 10/21/2022    LDL 93.00 10/21/2022    TOTALCHOLEST 3 10/21/2022     Lab Results   Component Value Date    TSH 1.5423 10/21/2022     Lab Results   Component Value Date    HGBA1C 5.4 10/21/2022          Assessment:     Problem List Items Addressed This Visit          Ophtho    Age-related cataract of right eye (Chronic)    Current Assessment & Plan     Patient is scheduled to have cataract surgery on her right eye.            Other    Preoperative clearance    Current Assessment & Plan     Based on today's evaluation, there does not seem to be any contraindication to the patient undergoing cataract surgery which will utilize local anesthesia with or without conscious monitored sedation.  Patient is medically stable to proceed with surgery as planned.               Current Outpatient Medications   Medication Instructions    co-enzyme Q-10 30 mg, Oral, 3 times daily    omega-3 fatty acids/fish oil  (FISH OIL-OMEGA-3 FATTY ACIDS) 300-1,000 mg capsule Oral, Daily    rosuvastatin (CRESTOR) 10 mg, Oral, Daily    SF 5000 PLUS 1.1 % Crea Oral, 2 times daily    soy isofla/blk cohosh/mag bark (ESTROVEN ORAL) Oral, Daily    TUMERIC-GING-OLIVE-OREG-CAPRYL ORAL Oral     Plan:             No follow-ups on file.

## 2023-10-10 DIAGNOSIS — Z23 NEED FOR INFLUENZA VACCINATION: Primary | ICD-10-CM

## 2023-10-26 ENCOUNTER — CLINICAL SUPPORT (OUTPATIENT)
Dept: PRIMARY CARE CLINIC | Facility: CLINIC | Age: 73
End: 2023-10-26
Payer: MEDICARE

## 2023-10-26 DIAGNOSIS — R79.9 ABNORMAL BLOOD CHEMISTRY: ICD-10-CM

## 2023-10-26 DIAGNOSIS — Z00.00 MEDICARE ANNUAL WELLNESS VISIT, SUBSEQUENT: ICD-10-CM

## 2023-10-26 DIAGNOSIS — E78.5 DYSLIPIDEMIA: Chronic | ICD-10-CM

## 2023-10-26 DIAGNOSIS — D72.819 LEUKOPENIA, UNSPECIFIED TYPE: ICD-10-CM

## 2023-10-26 LAB
ALBUMIN SERPL-MCNC: 4.3 G/DL (ref 3.4–4.8)
ALBUMIN/GLOB SERPL: 1.8 RATIO (ref 1.1–2)
ALP SERPL-CCNC: 63 UNIT/L (ref 40–150)
ALT SERPL-CCNC: 20 UNIT/L (ref 0–55)
AST SERPL-CCNC: 19 UNIT/L (ref 5–34)
BASOPHILS # BLD AUTO: 0.05 X10(3)/MCL
BASOPHILS NFR BLD AUTO: 1.2 %
BILIRUB SERPL-MCNC: 0.6 MG/DL
BUN SERPL-MCNC: 8.1 MG/DL (ref 9.8–20.1)
CALCIUM SERPL-MCNC: 9.2 MG/DL (ref 8.4–10.2)
CHLORIDE SERPL-SCNC: 107 MMOL/L (ref 98–107)
CHOLEST SERPL-MCNC: 147 MG/DL
CHOLEST/HDLC SERPL: 3 {RATIO} (ref 0–5)
CO2 SERPL-SCNC: 28 MMOL/L (ref 23–31)
CREAT SERPL-MCNC: 0.7 MG/DL (ref 0.55–1.02)
EOSINOPHIL # BLD AUTO: 0.13 X10(3)/MCL (ref 0–0.9)
EOSINOPHIL NFR BLD AUTO: 3 %
ERYTHROCYTE [DISTWIDTH] IN BLOOD BY AUTOMATED COUNT: 13.4 % (ref 11.5–17)
EST. AVERAGE GLUCOSE BLD GHB EST-MCNC: 102.5 MG/DL
GFR SERPLBLD CREATININE-BSD FMLA CKD-EPI: >60 MLS/MIN/1.73/M2
GLOBULIN SER-MCNC: 2.4 GM/DL (ref 2.4–3.5)
GLUCOSE SERPL-MCNC: 78 MG/DL (ref 82–115)
HBA1C MFR BLD: 5.2 %
HCT VFR BLD AUTO: 44.1 % (ref 37–47)
HCV AB SERPL QL IA: NONREACTIVE
HDLC SERPL-MCNC: 57 MG/DL (ref 35–60)
HGB BLD-MCNC: 14.7 G/DL (ref 12–16)
IMM GRANULOCYTES # BLD AUTO: 0.01 X10(3)/MCL (ref 0–0.04)
IMM GRANULOCYTES NFR BLD AUTO: 0.2 %
LDLC SERPL CALC-MCNC: 74 MG/DL (ref 50–140)
LYMPHOCYTES # BLD AUTO: 1.7 X10(3)/MCL (ref 0.6–4.6)
LYMPHOCYTES NFR BLD AUTO: 39.6 %
MCH RBC QN AUTO: 29 PG (ref 27–31)
MCHC RBC AUTO-ENTMCNC: 33.3 G/DL (ref 33–36)
MCV RBC AUTO: 87 FL (ref 80–94)
MONOCYTES # BLD AUTO: 0.48 X10(3)/MCL (ref 0.1–1.3)
MONOCYTES NFR BLD AUTO: 11.2 %
NEUTROPHILS # BLD AUTO: 1.92 X10(3)/MCL (ref 2.1–9.2)
NEUTROPHILS NFR BLD AUTO: 44.8 %
NRBC BLD AUTO-RTO: 0 %
PLATELET # BLD AUTO: 229 X10(3)/MCL (ref 130–400)
PMV BLD AUTO: 11.8 FL (ref 7.4–10.4)
POTASSIUM SERPL-SCNC: 3.7 MMOL/L (ref 3.5–5.1)
PROT SERPL-MCNC: 6.7 GM/DL (ref 5.8–7.6)
RBC # BLD AUTO: 5.07 X10(6)/MCL (ref 4.2–5.4)
SODIUM SERPL-SCNC: 142 MMOL/L (ref 136–145)
TRIGL SERPL-MCNC: 78 MG/DL (ref 37–140)
TSH SERPL-ACNC: 1.85 UIU/ML (ref 0.35–4.94)
VLDLC SERPL CALC-MCNC: 16 MG/DL
WBC # SPEC AUTO: 4.29 X10(3)/MCL (ref 4.5–11.5)

## 2023-10-26 PROCEDURE — 36415 COLL VENOUS BLD VENIPUNCTURE: CPT | Mod: ,,, | Performed by: GENERAL PRACTICE

## 2023-10-26 PROCEDURE — 36415 COLL VENOUS BLD VENIPUNCTURE: CPT

## 2023-10-26 PROCEDURE — 36415 PR COLLECTION VENOUS BLOOD,VENIPUNCTURE: ICD-10-PCS | Mod: ,,, | Performed by: GENERAL PRACTICE

## 2023-10-26 NOTE — PROGRESS NOTES
Patient verified name and .Patient here for nurse visit to draw AW labs with (22-) gauge needle. Patient was drawn in left antecubital .No complications or issues occurred. Patient tolerated venipuncture well. Patient expressed no questions or concerns.

## 2023-10-28 PROBLEM — D70.0 CONGENITAL NEUTROPENIA: Status: ACTIVE | Noted: 2022-10-24

## 2023-10-28 NOTE — ASSESSMENT & PLAN NOTE
Component Ref Range & Units 2 d ago  (10/26/23) 1 yr ago  (10/21/22) 2 yr ago  (10/12/21) 2 yr ago  (10/12/21) 3 yr ago  (9/28/20) 3 yr ago  (9/28/20) 4 yr ago  (9/23/19) 4 yr ago  (9/23/19)   WBC 4.50 - 11.50 x10(3)/mcL 4.29 Low   4.2 Low  R  4.5 R   3.7 Low  R   6.6 R     RBC 4.20 - 5.40 x10(6)/mcL 5.07  5.17  5.37   4.99   5.15     Hgb 12.0 - 16.0 g/dL 14.7  15.2 R  15.1 R   14.3 R   14.5 R     Hct 37.0 - 47.0 % 44.1  45.5  47.8 High    43.8   45.9     MCV 80.0 - 94.0 fL 87.0  88.0  89.0   87.8   89.1     MCH 27.0 - 31.0 pg 29.0  29.4  28.1   28.7   28.2     MCHC 33.0 - 36.0 g/dL 33.3  33.4 R  31.6 Low  R   32.6 Low  R   31.6 Low  R     RDW 11.5 - 17.0 % 13.4  13.4  13.3   13.3   13.1     Platelet 130 - 400 x10(3)/mcL 229  236  253   207   212       Monitoring annually.  WBC counts have been stable.

## 2023-10-28 NOTE — ASSESSMENT & PLAN NOTE
Component Ref Range & Units 2 d ago  (10/26/23) 1 yr ago  (10/21/22) 2 yr ago  (10/12/21) 3 yr ago  (9/28/20) 4 yr ago  (9/23/19) 5 yr ago  (9/21/18) 5 yr ago  (3/21/18)   Cholesterol Total <=200 mg/dL 147  167  154 R  146 R  145 R  157 R  175 R    HDL Cholesterol 35 - 60 mg/dL 57  58  53  57  59  60  61 High     Triglyceride 37 - 140 mg/dL 78  80  100  79  108 R  110 R  124 R    Cholesterol/HDL Ratio 0 - 5 3  3  3  3  2.5 R  2.6 R  2.9 R    Very Low Density Lipoprotein  16  16  20  16  22 R  22 R  25 R    LDL Cholesterol 50.00 - 140.00 mg/dL 74.00  93.00  81.00  73.00  64 R  75 R  89 R      Most recent cholesterol/lipid blood testing shows adequate control, continue current treatment plan, including prescription medications if prescribed, and/or diet high in fiber, low in saturated fats as discussed during clinic visit.

## 2023-10-28 NOTE — PROGRESS NOTES
Patient ID: [id number]     Chief Complaint: Annual Exam (Labs done )      HPI:     Elisha Reynoso is a 72 y.o. female here today for a Medicare Wellness. No other complaints today.       ----------------------------  Breast cancer  Hyperlipidemia     Past Surgical History:   Procedure Laterality Date    BREAST SURGERY      CATARACT EXTRACTION  07/2023    COLONOSCOPY  02/25/2015    Tommy Sharma MD    HYSTERECTOMY         Review of patient's allergies indicates:  No Known Allergies    No outpatient medications have been marked as taking for the 10/30/23 encounter (Office Visit) with MarcoA ntonio Armstrong MD.       Social History     Socioeconomic History    Marital status:    Tobacco Use    Smoking status: Never    Smokeless tobacco: Never   Substance and Sexual Activity    Alcohol use: Never    Drug use: Never        Family History   Family history unknown: Yes        Patient Care Team:  Marco Antonio Armstrong MD as PCP - General (Family Medicine)  Lila Ayala MD as Consulting Physician (Gynecology)  [facility] -  Tommy Sharma MD as Consulting Physician (Gastroenterology)     Opioid Screening: Patient medication list reviewed, patient is not taking prescription opioids. Patient is not using additional opioids than prescribed. Patient is at low risk of substance abuse based on this opioid use history.       Subjective:     Review of Systems   Constitutional: Negative.  Negative for malaise/fatigue and weight loss.   HENT: Negative.     Eyes: Negative.    Respiratory: Negative.  Negative for shortness of breath.    Cardiovascular: Negative.  Negative for chest pain.   Gastrointestinal: Negative.    Genitourinary: Negative.    Musculoskeletal: Negative.    Skin: Negative.    Neurological: Negative.  Negative for focal weakness, weakness and headaches.   Endo/Heme/Allergies: Negative.    Psychiatric/Behavioral: Negative.  Negative for depression and memory loss. The patient is not  nervous/anxious.          Patient Reported Health Risk Assessment  What [age] 70-79  Are you male or female?: Female  During the past four weeks, how much have you been bothered by emotional problems such as feeling anxious, depressed, irritable, sad, or downhearted and blue?: Not at all  During the past five weeks, has your physical and/or emotional health limited your social activities with family, friends, neighbors, or groups?: Not at all  During the past four weeks, how much bodily pain have you generally had?: Mild pain  During the past four weeks, was someone available to help if you needed and wanted help?: Yes, as much as I wanted  During the past four weeks, what was the hardest physical activity you could do for at least two minutes?: Moderate  Can you get to places out of walking distance without help?  (For example, can you travel alone on buses or taxis, or drive your own car?): Yes  Can you go shopping for groceries or clothes without someone's help?: Yes  Can you prepare your own meals?: Yes  Can you do your own housework without help?: Yes  Because of any health problems, do you need the help of another person with your personal care needs such as eating, bathing, dressing, or getting around the house?: No  Can you handle your own money without help?: Yes  During the past four weeks, how would you rate your health in general?: Very good  How have things been going for you during the past four weeks?: Pretty well  Are you having difficulties driving your car?: Yes, often  Do you always fasten your seat belt when you are in a car?: Yes, usually  How often in the past four weeks have you been bothered by falling or dizzy when standing up?: Seldom  How often in the past four weeks have you been bothered by sexual problems?: Never  How often in the past four weeks have you been bothered by trouble eating well?: Seldom  How often in the past four weeks have you been bothered by teeth or denture  "problems?: Seldom  How often in the past four weeks have you been bothered with problems using the telephone?: Seldom  How often in the past four weeks have you been bothered by tiredness or fatigue?: Seldom  Have you fallen two or more times in the past year?: No  Are you afraid of falling?: No  Are you a smoker?: No  During the past four weeks, how many drinks of wine, beer, or other alcoholic beverages did you have?: One drink or less per week  Do you exercise for about 20 minutes three or more days a week?: Yes, some of the time  Have you been given any information to help you with hazards in your house that might hurt you?: Yes  Have you been given any information to help you with keeping track of your medications?: Yes  How often do you have trouble taking medicines the way you've been told to take them?: I always take them as prescribed  How confident are you that you can control and manage most of your health problems?: Very confident  What is your race? (Check all that apply.):     Objective:     /88   Pulse 97   Ht 5' 2" (1.575 m)   Wt 65.3 kg (144 lb)   SpO2 98%   BMI 26.34 kg/m²     Physical Exam  Constitutional:       Appearance: Normal appearance.   HENT:      Head: Normocephalic.      Mouth/Throat:      Mouth: Mucous membranes are moist.      Pharynx: Oropharynx is clear.   Eyes:      Conjunctiva/sclera: Conjunctivae normal.      Pupils: Pupils are equal, round, and reactive to light.   Cardiovascular:      Rate and Rhythm: Normal rate and regular rhythm.      Heart sounds: Normal heart sounds.   Pulmonary:      Effort: Pulmonary effort is normal.      Breath sounds: Normal breath sounds.   Abdominal:      General: Abdomen is flat.      Palpations: Abdomen is soft.   Musculoskeletal:         General: Normal range of motion.      Cervical back: Neck supple.   Skin:     General: Skin is warm and dry.   Neurological:      General: No focal deficit present.      Mental Status: She is " alert and oriented to person, place, and time.   Psychiatric:         Mood and Affect: Mood normal.         Behavior: Behavior normal.         Thought Content: Thought content normal.         Judgment: Judgment normal.         Lab Results   Component Value Date     10/26/2023    K 3.7 10/26/2023    CO2 28 10/26/2023    BUN 8.1 (L) 10/26/2023    CREATININE 0.70 10/26/2023    CALCIUM 9.2 10/26/2023    ALBUMIN 4.3 10/26/2023    BILITOT 0.6 10/26/2023    ALKPHOS 63 10/26/2023    AST 19 10/26/2023    ALT 20 10/26/2023    EGFRNORACEVR >60 10/26/2023     Lab Results   Component Value Date    WBC 4.29 (L) 10/26/2023    RBC 5.07 10/26/2023    HGB 14.7 10/26/2023    HCT 44.1 10/26/2023    MCV 87.0 10/26/2023    RDW 13.4 10/26/2023     10/26/2023      Lab Results   Component Value Date    CHOL 147 10/26/2023    TRIG 78 10/26/2023    HDL 57 10/26/2023    LDL 74.00 10/26/2023    TOTALCHOLEST 3 10/26/2023     Lab Results   Component Value Date    TSH 1.852 10/26/2023     Lab Results   Component Value Date    HGBA1C 5.2 10/26/2023             No data to display                  10/30/2023    10:30 AM 7/6/2023     4:15 PM 10/25/2022    10:30 AM 8/29/2022     2:20 PM   Fall Risk Assessment - Outpatient   Mobility Status Ambulatory Ambulatory Ambulatory Ambulatory   Number of falls 0 0 0 0   Identified as fall risk False False False False           Depression Screening  Over the past two weeks, has the patient felt down, depressed, or hopeless?: No  Over the past two weeks, has the patient felt little interest or pleasure in doing things?: No  Functional Ability/Safety Screening  Was the patient's timed Up & Go test unsteady or longer than 30 seconds?: No  Does the patient need help with phone, transportation, shopping, preparing meals, housework, laundry, meds, or managing money?: No  Does the patient's home have rugs in the hallway, lack grab bars in the bathroom, lack handrails on the stairs or have poor lighting?:  No  Have you noticed any hearing difficulties?: No  Assessment:       ICD-10-CM ICD-9-CM   1. Medicare annual wellness visit, subsequent  Z00.00 V70.0   2. Dyslipidemia  E78.5 272.4   3. Congenital neutropenia  D70.0 288.01   4. Age-related osteoporosis without current pathological fracture  M81.0 733.01   5. Abnormal blood chemistry  R79.9 790.6   6. Screening mammogram for breast cancer  Z12.31 V76.12        Plan:     Medicare Annual Wellness and Personalized Prevention Plan:   Fall Risk + Home Safety + Hearing Impairment + Depression Screen + Cognitive Impairment Screen + Health Risk Assessment all reviewed.       Health Maintenance Topics with due status: Not Due       Topic Last Completion Date    Colorectal Cancer Screening 02/25/2015    Lipid Panel 10/26/2023      The patient's Health Maintenance was reviewed and the following appears to be due at this time:   Health Maintenance Due   Topic Date Due    RSV Vaccine (Age 60+) (1 - 1-dose 60+ series) Never done    DEXA Scan  03/09/2023    COVID-19 Vaccine (3 - 2023-24 season) 09/01/2023    Mammogram  10/26/2023         1. Medicare annual wellness visit, subsequent  Comments:  Overall health status was reviewed.  Good health habits reinforced.  Annual wellness exams recommended.  Orders:  -     Comprehensive Metabolic Panel; Future; Expected date: 10/28/2024  -     CBC Auto Differential; Future; Expected date: 10/28/2024  -     Lipid Panel; Future; Expected date: 10/28/2024  -     Hemoglobin A1C; Future; Expected date: 10/28/2024  -     TSH; Future; Expected date: 10/28/2024    2. Dyslipidemia  Overview:  Prescribed Crestor 10 mg daily, also takes Omega three fatty acids.    Assessment & Plan:  Component Ref Range & Units 2 d ago  (10/26/23) 1 yr ago  (10/21/22) 2 yr ago  (10/12/21) 3 yr ago  (9/28/20) 4 yr ago  (9/23/19) 5 yr ago  (9/21/18) 5 yr ago  (3/21/18)   Cholesterol Total <=200 mg/dL 147  167  154 R  146 R  145 R  157 R  175 R    HDL Cholesterol 35 - 60  mg/dL 57  58  53  57  59  60  61 High     Triglyceride 37 - 140 mg/dL 78  80  100  79  108 R  110 R  124 R    Cholesterol/HDL Ratio 0 - 5 3  3  3  3  2.5 R  2.6 R  2.9 R    Very Low Density Lipoprotein  16  16  20  16  22 R  22 R  25 R    LDL Cholesterol 50.00 - 140.00 mg/dL 74.00  93.00  81.00  73.00  64 R  75 R  89 R      Most recent cholesterol/lipid blood testing shows adequate control, continue current treatment plan, including prescription medications if prescribed, and/or diet high in fiber, low in saturated fats as discussed during clinic visit.    Orders:  -     Lipid Panel; Future; Expected date: 10/28/2024  -     TSH; Future; Expected date: 10/28/2024    3. Congenital neutropenia  Overview:  Benign neutropenia.    Assessment & Plan:  Component Ref Range & Units 2 d ago  (10/26/23) 1 yr ago  (10/21/22) 2 yr ago  (10/12/21) 2 yr ago  (10/12/21) 3 yr ago  (9/28/20) 3 yr ago  (9/28/20) 4 yr ago  (9/23/19) 4 yr ago  (9/23/19)   WBC 4.50 - 11.50 x10(3)/mcL 4.29 Low   4.2 Low  R  4.5 R   3.7 Low  R   6.6 R     RBC 4.20 - 5.40 x10(6)/mcL 5.07  5.17  5.37   4.99   5.15     Hgb 12.0 - 16.0 g/dL 14.7  15.2 R  15.1 R   14.3 R   14.5 R     Hct 37.0 - 47.0 % 44.1  45.5  47.8 High    43.8   45.9     MCV 80.0 - 94.0 fL 87.0  88.0  89.0   87.8   89.1     MCH 27.0 - 31.0 pg 29.0  29.4  28.1   28.7   28.2     MCHC 33.0 - 36.0 g/dL 33.3  33.4 R  31.6 Low  R   32.6 Low  R   31.6 Low  R     RDW 11.5 - 17.0 % 13.4  13.4  13.3   13.3   13.1     Platelet 130 - 400 x10(3)/mcL 229  236  253   207   212       Monitoring annually.  WBC counts have been stable.    Orders:  -     CBC Auto Differential; Future; Expected date: 10/28/2024  -     TSH; Future; Expected date: 10/28/2024    4. Age-related osteoporosis without current pathological fracture  Overview:  Had bone scan done 2020, we will look for her results.  She was on medications for osteoporosis, probably Fosamax, she had a problem with a tooth, and the Fosamax was  discontinued.    From last documented bone scan 2013:      The results are as follows:     HIP:  BMD is 0.731 and the T-score is -1.7   LUMBAR SPINE:  BMD is 0.689 and the T-score is -3.5       Orders:  -     TSH; Future; Expected date: 10/28/2024    5. Abnormal blood chemistry  Comments:  Most recent comprehensive metabolic panel did show one or more abnormal values, requiring further testing for investigatory purposes.  Orders:  -     Hemoglobin A1C; Future; Expected date: 10/28/2024  -     TSH; Future; Expected date: 10/28/2024    6. Screening mammogram for breast cancer  Comments:  Screening mammogram scheduled.  Orders:  -     Mammo Digital Screening Bilat; Future; Expected date: 10/30/2023            Advance Care Planning     Date: 10/28/2023    Living Will  During this visit, I engaged the patient  in the voluntary advance care planning process.  The patient and I reviewed the role for advance directives and their purpose in directing future healthcare if the patient's unable to speak for him/herself.  At this point in time, the patient does have full decision-making capacity.  We discussed different extreme health states that she could experience, and reviewed what kind of medical care she would want in those situations.  The patient communicated that if she were comatose and had little chance of a meaningful recovery, she would not want machines/life-sustaining treatments used. In addition to the above preference, other important end-of-life issues for the patient include no other issues.  Patient did complete a living will forearm which was signed and scanned into her chart.  I spent a total of 10 minutes engaging the patient in this advance care planning discussion.              Current Outpatient Medications   Medication Instructions    co-enzyme Q-10 30 mg, Oral, 3 times daily    omega-3 fatty acids/fish oil (FISH OIL-OMEGA-3 FATTY ACIDS) 300-1,000 mg capsule Oral, Daily    rosuvastatin (CRESTOR) 10 mg,  Oral, Daily    SF 5000 PLUS 1.1 % Crea Oral, 2 times daily    soy isofla/blk cohosh/mag bark (ESTROVEN ORAL) Oral, Daily    TUMERIC-GING-OLIVE-OREG-CAPRYL ORAL Oral        Follow up in about 53 weeks (around 11/4/2024) for Medicare Wellness. In addition to their scheduled follow up, the patient has also been instructed to follow up on as needed basis.

## 2023-10-30 ENCOUNTER — DOCUMENTATION ONLY (OUTPATIENT)
Dept: PRIMARY CARE CLINIC | Facility: CLINIC | Age: 73
End: 2023-10-30

## 2023-10-30 ENCOUNTER — OFFICE VISIT (OUTPATIENT)
Dept: PRIMARY CARE CLINIC | Facility: CLINIC | Age: 73
End: 2023-10-30
Payer: MEDICARE

## 2023-10-30 VITALS
OXYGEN SATURATION: 98 % | SYSTOLIC BLOOD PRESSURE: 132 MMHG | HEIGHT: 62 IN | HEART RATE: 97 BPM | DIASTOLIC BLOOD PRESSURE: 88 MMHG | BODY MASS INDEX: 26.5 KG/M2 | WEIGHT: 144 LBS

## 2023-10-30 DIAGNOSIS — R79.9 ABNORMAL BLOOD CHEMISTRY: ICD-10-CM

## 2023-10-30 DIAGNOSIS — D70.0 CONGENITAL NEUTROPENIA: Chronic | ICD-10-CM

## 2023-10-30 DIAGNOSIS — E78.5 DYSLIPIDEMIA: Chronic | ICD-10-CM

## 2023-10-30 DIAGNOSIS — Z12.31 SCREENING MAMMOGRAM FOR BREAST CANCER: ICD-10-CM

## 2023-10-30 DIAGNOSIS — M81.0 AGE-RELATED OSTEOPOROSIS WITHOUT CURRENT PATHOLOGICAL FRACTURE: Chronic | ICD-10-CM

## 2023-10-30 DIAGNOSIS — Z00.00 MEDICARE ANNUAL WELLNESS VISIT, SUBSEQUENT: Primary | ICD-10-CM

## 2023-10-30 PROCEDURE — G0439 PR MEDICARE ANNUAL WELLNESS SUBSEQUENT VISIT: ICD-10-PCS | Mod: ,,, | Performed by: GENERAL PRACTICE

## 2023-10-30 PROCEDURE — G0439 PPPS, SUBSEQ VISIT: HCPCS | Mod: ,,, | Performed by: GENERAL PRACTICE

## 2023-11-13 ENCOUNTER — OFFICE VISIT (OUTPATIENT)
Dept: URGENT CARE | Facility: CLINIC | Age: 73
End: 2023-11-13
Payer: MEDICARE

## 2023-11-13 VITALS
HEIGHT: 62 IN | WEIGHT: 144 LBS | HEART RATE: 75 BPM | BODY MASS INDEX: 26.5 KG/M2 | OXYGEN SATURATION: 97 % | RESPIRATION RATE: 18 BRPM | TEMPERATURE: 98 F | DIASTOLIC BLOOD PRESSURE: 87 MMHG | SYSTOLIC BLOOD PRESSURE: 126 MMHG

## 2023-11-13 DIAGNOSIS — R05.9 COUGH, UNSPECIFIED TYPE: Primary | ICD-10-CM

## 2023-11-13 LAB
CTP QC/QA: YES
POC MOLECULAR INFLUENZA A AGN: NEGATIVE
POC MOLECULAR INFLUENZA B AGN: NEGATIVE

## 2023-11-13 PROCEDURE — 96372 THER/PROPH/DIAG INJ SC/IM: CPT | Mod: ,,,

## 2023-11-13 PROCEDURE — 99213 PR OFFICE/OUTPT VISIT, EST, LEVL III, 20-29 MIN: ICD-10-PCS | Mod: 25,,,

## 2023-11-13 PROCEDURE — 99213 OFFICE O/P EST LOW 20 MIN: CPT | Mod: 25,,,

## 2023-11-13 PROCEDURE — 87502 INFLUENZA DNA AMP PROBE: CPT | Mod: QW,,,

## 2023-11-13 PROCEDURE — 96372 PR INJECTION,THERAP/PROPH/DIAG2ST, IM OR SUBCUT: ICD-10-PCS | Mod: ,,,

## 2023-11-13 PROCEDURE — 87502 POCT INFLUENZA A/B MOLECULAR: ICD-10-PCS | Mod: QW,,,

## 2023-11-13 RX ORDER — DOXYCYCLINE 100 MG/1
100 CAPSULE ORAL EVERY 12 HOURS
Qty: 14 CAPSULE | Refills: 0 | Status: SHIPPED | OUTPATIENT
Start: 2023-11-13 | End: 2023-11-20

## 2023-11-13 RX ORDER — PROMETHAZINE HYDROCHLORIDE AND DEXTROMETHORPHAN HYDROBROMIDE 6.25; 15 MG/5ML; MG/5ML
5 SYRUP ORAL EVERY 6 HOURS PRN
Qty: 100 ML | Refills: 0 | Status: SHIPPED | OUTPATIENT
Start: 2023-11-13 | End: 2023-11-18

## 2023-11-13 RX ORDER — DEXAMETHASONE SODIUM PHOSPHATE 100 MG/10ML
5 INJECTION INTRAMUSCULAR; INTRAVENOUS
Status: COMPLETED | OUTPATIENT
Start: 2023-11-13 | End: 2023-11-13

## 2023-11-13 RX ADMIN — DEXAMETHASONE SODIUM PHOSPHATE 5 MG: 100 INJECTION INTRAMUSCULAR; INTRAVENOUS at 02:11

## 2023-11-13 NOTE — PROGRESS NOTES
"Subjective:      Patient ID: Elisha Reynoso is a 72 y.o. female.    Vitals:  height is 5' 2" (1.575 m) and weight is 65.3 kg (144 lb). Her temperature is 98.2 °F (36.8 °C). Her blood pressure is 126/87 and her pulse is 75. Her respiration is 18 and oxygen saturation is 97%.     Chief Complaint: Cough (Pt presents to c/o cough starting two weeks ago, worse yesterday. Pt's family has the flu. )    A 73 y/o female presents to the clinic with c/o cough, nasal congestion, chest congestion and bilateral ear pressure x 2 weeks. She reports worsening cough over the last few days. Multiple family members currently have the flu. She denies any hx of asthma, wheezing, sob, cp, n/v/d, abdominal complaints, rash, difficulty swallowing, neck stiffness, or changes in intake or output.       Cough  Associated symptoms include postnasal drip and a sore throat. Pertinent negatives include no chills, fever, shortness of breath or wheezing.       Constitution: Negative. Negative for chills and fever.   HENT:  Positive for congestion, postnasal drip and sore throat. Negative for trouble swallowing and voice change.    Neck: neck negative.   Eyes: Negative.    Respiratory:  Positive for cough and sputum production. Negative for shortness of breath, wheezing and asthma.    Gastrointestinal: Negative.    Allergic/Immunologic: Negative for asthma.      Objective:     Physical Exam   Constitutional: She is oriented to person, place, and time. She appears well-developed. She is cooperative.  Non-toxic appearance. She does not appear ill. No distress.   HENT:   Head: Normocephalic and atraumatic.   Ears:   Right Ear: Hearing, tympanic membrane and external ear normal. impacted cerumen  Left Ear: Hearing, tympanic membrane and external ear normal.   Nose: Congestion present.   Mouth/Throat: Mucous membranes are normal. Mucous membranes are moist. Posterior oropharyngeal erythema present. Oropharynx is clear.   Eyes: Conjunctivae and lids are " normal.   Neck: Trachea normal and phonation normal. Neck supple. No edema present. No erythema present. No neck rigidity present.   Cardiovascular: Normal rate.   Pulmonary/Chest: Effort normal and breath sounds normal. No stridor. No respiratory distress. She has no decreased breath sounds. She has no wheezes. She has no rhonchi. She has no rales.   Abdominal: Normal appearance.   Neurological: She is alert and oriented to person, place, and time. She exhibits normal muscle tone. Coordination normal.   Skin: Skin is warm, dry, intact, not diaphoretic and no rash. Capillary refill takes less than 2 seconds.   Psychiatric: Her speech is normal and behavior is normal. Mood normal.   Nursing note and vitals reviewed.         Previous History      Review of patient's allergies indicates:  No Known Allergies    Past Medical History:   Diagnosis Date    Breast cancer     Hyperlipidemia      Current Outpatient Medications   Medication Instructions    co-enzyme Q-10 30 mg, Oral, 3 times daily    doxycycline (MONODOX) 100 mg, Oral, Every 12 hours    omega-3 fatty acids/fish oil (FISH OIL-OMEGA-3 FATTY ACIDS) 300-1,000 mg capsule Oral, Daily    promethazine-dextromethorphan (PROMETHAZINE-DM) 6.25-15 mg/5 mL Syrp 5 mLs, Oral, Every 6 hours PRN    rosuvastatin (CRESTOR) 10 mg, Oral, Daily    SF 5000 PLUS 1.1 % Crea Oral, 2 times daily    soy isofla/blk cohosh/mag bark (ESTROVEN ORAL) Oral, Daily    TUMERIC-GING-OLIVE-OREG-CAPRYL ORAL Oral     Past Surgical History:   Procedure Laterality Date    BREAST SURGERY      CATARACT EXTRACTION  07/2023    COLONOSCOPY  02/25/2015    Tommy Sharma MD    HYSTERECTOMY       Family History   Family history unknown: Yes       Social History     Tobacco Use    Smoking status: Never    Smokeless tobacco: Never   Substance Use Topics    Alcohol use: Never    Drug use: Never        Physical Exam      Vital Signs Reviewed   /87   Pulse 75   Temp 98.2 °F (36.8 °C)   Resp 18   Ht 5'  "2" (1.575 m)   Wt 65.3 kg (144 lb)   SpO2 97%   BMI 26.34 kg/m²        Procedures    Procedures     Labs     Results for orders placed or performed in visit on 11/13/23   POCT Influenza A/B Molecular   Result Value Ref Range    POC Molecular Influenza A Ag Negative Negative, Not Reported    POC Molecular Influenza B Ag Negative Negative, Not Reported     Acceptable Yes        Assessment:     1. Cough, unspecified type        Plan:       Cough, unspecified type  -     POCT Influenza A/B Molecular  -     X-Ray Chest PA And Lateral    Other orders  -     dexAMETHasone injection 5 mg  -     doxycycline (MONODOX) 100 MG capsule; Take 1 capsule (100 mg total) by mouth every 12 (twelve) hours. for 7 days  Dispense: 14 capsule; Refill: 0  -     promethazine-dextromethorphan (PROMETHAZINE-DM) 6.25-15 mg/5 mL Syrp; Take 5 mLs by mouth every 6 (six) hours as needed (cough).  Dispense: 100 mL; Refill: 0      Flu negative   Chest xray negative     Start taking an allergy pill daily such as claritin, zyrtec, allegrea or xyzal. Also start using a nasal steroid spray such as flonase or nasacort daily. If you are not being treated for high blood pressure, you can also take decongestant such as sudafed as needed. They can be purchased over the counter. If oral steroids were prescribed, start them tomorrow morning. Monitor for fever. Take tylenol/acetaminophen or ibuprofen as needed. Rest and hydrate. If symptoms persist or worsen, return to clinic or seek medical attention immediately.                 "

## 2023-11-13 NOTE — PATIENT INSTRUCTIONS
Flu negative   Chest xray negative     Medications called in to pharmacy   Caution with the cough medication as it may cause sedation, do not drive or drink alcohol while taking it.   Start taking an allergy pill daily such as claritin, zyrtec, allegrea or xyzal. Also start using a nasal steroid spray such as flonase or nasacort daily. If you are not being treated for high blood pressure, you can also take decongestant such as sudafed as needed. They can be purchased over the counter. If oral steroids were prescribed, start them tomorrow morning. Monitor for fever. Take tylenol/acetaminophen or ibuprofen as needed. Rest and hydrate. If symptoms persist or worsen, return to clinic or seek medical attention immediately.   
Implemented All Universal Safety Interventions:  [place] to call system. Call bell, personal items and telephone within reach. Instruct patient to call for assistance. Room bathroom lighting operational. Non-slip footwear when patient is off stretcher. Physically safe environment: no spills, clutter or unnecessary equipment. Stretcher in lowest position, wheels locked, appropriate side rails in place.

## 2023-11-16 ENCOUNTER — TELEPHONE (OUTPATIENT)
Dept: PRIMARY CARE CLINIC | Facility: CLINIC | Age: 73
End: 2023-11-16
Payer: MEDICARE

## 2023-11-16 DIAGNOSIS — E78.5 DYSLIPIDEMIA: Chronic | ICD-10-CM

## 2023-11-16 RX ORDER — ROSUVASTATIN CALCIUM 10 MG/1
10 TABLET, COATED ORAL DAILY
Qty: 90 TABLET | Refills: 3 | Status: SHIPPED | OUTPATIENT
Start: 2023-11-16

## 2023-11-16 NOTE — TELEPHONE ENCOUNTER
----- Message from Tatiana Munguia sent at 11/16/2023 11:01 AM CST -----  Regarding: Med refill  Pt is requesting a refill on Rosuvastatin sent to [facility] pharmacy on [address]. Thank you

## 2024-01-08 ENCOUNTER — TELEPHONE (OUTPATIENT)
Dept: PRIMARY CARE CLINIC | Facility: CLINIC | Age: 74
End: 2024-01-08
Payer: MEDICARE

## 2024-01-08 NOTE — TELEPHONE ENCOUNTER
Called pt and she stated mammogram order goes to [facility]. Routed mammo order to [facility]

## 2024-01-08 NOTE — TELEPHONE ENCOUNTER
----- Message from Shahid Wing sent at 1/8/2024  9:57 AM CST -----  Type:  Mammogram    Caller is requesting to schedule their annual mammogram appointment.  Order is not listed in EPIC.  Please enter order and contact patient to schedule.  Name of Caller:pt     Would the patient rather a call back or a response via [facility]? Call back   Best Call Back Number: [phone]  Additional Information: pt called stated that she was told by [facility] that she had to bring order with her , please call pt back to discuss . Pt stated appt is tomorrow.

## 2024-01-09 LAB — BCS RECOMMENDATION EXT: NORMAL

## 2024-01-10 ENCOUNTER — DOCUMENTATION ONLY (OUTPATIENT)
Dept: PRIMARY CARE CLINIC | Facility: CLINIC | Age: 74
End: 2024-01-10
Payer: MEDICARE

## 2024-05-20 ENCOUNTER — HOSPITAL ENCOUNTER (EMERGENCY)
Facility: HOSPITAL | Age: 74
Discharge: HOME | End: 2024-05-20
Attending: EMERGENCY MEDICINE
Payer: MEDICARE

## 2024-05-20 ENCOUNTER — OFFICE VISIT (OUTPATIENT)
Dept: URGENT CARE | Facility: CLINIC | Age: 74
End: 2024-05-20
Payer: MEDICARE

## 2024-05-20 VITALS
TEMPERATURE: 99 F | SYSTOLIC BLOOD PRESSURE: 147 MMHG | HEIGHT: 62 IN | BODY MASS INDEX: 26.68 KG/M2 | OXYGEN SATURATION: 96 % | DIASTOLIC BLOOD PRESSURE: 96 MMHG | HEART RATE: 86 BPM | WEIGHT: 145 LBS | RESPIRATION RATE: 18 BRPM

## 2024-05-20 VITALS
SYSTOLIC BLOOD PRESSURE: 133 MMHG | HEIGHT: 62 IN | WEIGHT: 135 LBS | HEART RATE: 77 BPM | BODY MASS INDEX: 24.84 KG/M2 | OXYGEN SATURATION: 95 % | DIASTOLIC BLOOD PRESSURE: 85 MMHG | RESPIRATION RATE: 15 BRPM | TEMPERATURE: 98 F

## 2024-05-20 DIAGNOSIS — S09.92XA INJURY OF NOSE, INITIAL ENCOUNTER: Primary | ICD-10-CM

## 2024-05-20 DIAGNOSIS — S02.2XXA CLOSED FRACTURE OF NASAL BONE, INITIAL ENCOUNTER: ICD-10-CM

## 2024-05-20 DIAGNOSIS — T14.8XXA SKIN ABRASION: ICD-10-CM

## 2024-05-20 DIAGNOSIS — S09.90XA INJURY OF HEAD, INITIAL ENCOUNTER: Primary | ICD-10-CM

## 2024-05-20 DIAGNOSIS — S00.83XA CONTUSION OF FOREHEAD, INITIAL ENCOUNTER: ICD-10-CM

## 2024-05-20 LAB
ALBUMIN SERPL-MCNC: 4.5 G/DL (ref 3.4–4.8)
ALBUMIN/GLOB SERPL: 1.4 RATIO (ref 1.1–2)
ALP SERPL-CCNC: 76 UNIT/L (ref 40–150)
ALT SERPL-CCNC: 15 UNIT/L (ref 0–55)
ANION GAP SERPL CALC-SCNC: 9 MEQ/L
AST SERPL-CCNC: 17 UNIT/L (ref 5–34)
BASOPHILS # BLD AUTO: 0.07 X10(3)/MCL
BASOPHILS NFR BLD AUTO: 0.7 %
BILIRUB SERPL-MCNC: 0.4 MG/DL
BUN SERPL-MCNC: 16.2 MG/DL (ref 9.8–20.1)
CALCIUM SERPL-MCNC: 9.8 MG/DL (ref 8.4–10.2)
CHLORIDE SERPL-SCNC: 107 MMOL/L (ref 98–107)
CO2 SERPL-SCNC: 25 MMOL/L (ref 23–31)
CREAT SERPL-MCNC: 0.8 MG/DL (ref 0.55–1.02)
CREAT/UREA NIT SERPL: 20
EOSINOPHIL # BLD AUTO: 0.05 X10(3)/MCL (ref 0–0.9)
EOSINOPHIL NFR BLD AUTO: 0.5 %
ERYTHROCYTE [DISTWIDTH] IN BLOOD BY AUTOMATED COUNT: 13.3 % (ref 11.5–17)
GFR SERPLBLD CREATININE-BSD FMLA CKD-EPI: >60 ML/MIN/1.73/M2
GLOBULIN SER-MCNC: 3.2 GM/DL (ref 2.4–3.5)
GLUCOSE SERPL-MCNC: 97 MG/DL (ref 82–115)
HCT VFR BLD AUTO: 45.4 % (ref 37–47)
HGB BLD-MCNC: 15.1 G/DL (ref 12–16)
IMM GRANULOCYTES # BLD AUTO: 0.03 X10(3)/MCL (ref 0–0.04)
IMM GRANULOCYTES NFR BLD AUTO: 0.3 %
LYMPHOCYTES # BLD AUTO: 1.41 X10(3)/MCL (ref 0.6–4.6)
LYMPHOCYTES NFR BLD AUTO: 14.4 %
MCH RBC QN AUTO: 28.9 PG (ref 27–31)
MCHC RBC AUTO-ENTMCNC: 33.3 G/DL (ref 33–36)
MCV RBC AUTO: 86.8 FL (ref 80–94)
MONOCYTES # BLD AUTO: 0.59 X10(3)/MCL (ref 0.1–1.3)
MONOCYTES NFR BLD AUTO: 6 %
NEUTROPHILS # BLD AUTO: 7.66 X10(3)/MCL (ref 2.1–9.2)
NEUTROPHILS NFR BLD AUTO: 78.1 %
NRBC BLD AUTO-RTO: 0 %
PLATELET # BLD AUTO: 265 X10(3)/MCL (ref 130–400)
PMV BLD AUTO: 11.1 FL (ref 7.4–10.4)
POTASSIUM SERPL-SCNC: 4.2 MMOL/L (ref 3.5–5.1)
PROT SERPL-MCNC: 7.7 GM/DL (ref 5.8–7.6)
RBC # BLD AUTO: 5.23 X10(6)/MCL (ref 4.2–5.4)
SODIUM SERPL-SCNC: 141 MMOL/L (ref 136–145)
WBC # SPEC AUTO: 9.81 X10(3)/MCL (ref 4.5–11.5)

## 2024-05-20 PROCEDURE — 85025 COMPLETE CBC W/AUTO DIFF WBC: CPT | Performed by: EMERGENCY MEDICINE

## 2024-05-20 PROCEDURE — 96365 THER/PROPH/DIAG IV INF INIT: CPT

## 2024-05-20 PROCEDURE — 63600175 PHARM REV CODE 636 W HCPCS: Performed by: EMERGENCY MEDICINE

## 2024-05-20 PROCEDURE — G0390 TRAUMA RESPONS W/HOSP CRITI: HCPCS | Mod: TRAUMA2

## 2024-05-20 PROCEDURE — 90714 TD VACC NO PRESV 7 YRS+ IM: CPT | Mod: ,,,

## 2024-05-20 PROCEDURE — 99284 EMERGENCY DEPT VISIT MOD MDM: CPT | Mod: 25

## 2024-05-20 PROCEDURE — 80053 COMPREHEN METABOLIC PANEL: CPT | Performed by: EMERGENCY MEDICINE

## 2024-05-20 PROCEDURE — 90471 IMMUNIZATION ADMIN: CPT | Mod: ,,,

## 2024-05-20 PROCEDURE — 99213 OFFICE O/P EST LOW 20 MIN: CPT | Mod: 25,,,

## 2024-05-20 RX ORDER — HYDROCODONE BITARTRATE AND ACETAMINOPHEN 5; 325 MG/1; MG/1
1 TABLET ORAL
Status: DISCONTINUED | OUTPATIENT
Start: 2024-05-20 | End: 2024-05-20 | Stop reason: HOSPADM

## 2024-05-20 RX ORDER — MUPIROCIN 20 MG/G
OINTMENT TOPICAL 3 TIMES DAILY
Qty: 1 EACH | Refills: 1 | Status: SHIPPED | OUTPATIENT
Start: 2024-05-20 | End: 2024-05-30

## 2024-05-20 RX ORDER — CEFAZOLIN SODIUM 2 G/50ML
SOLUTION INTRAVENOUS
Status: COMPLETED | OUTPATIENT
Start: 2024-05-20 | End: 2024-05-20

## 2024-05-20 RX ORDER — CEFAZOLIN SODIUM 1 G/3ML
INJECTION, POWDER, FOR SOLUTION INTRAMUSCULAR; INTRAVENOUS
Status: COMPLETED
Start: 2024-05-20 | End: 2024-05-20

## 2024-05-20 RX ORDER — KETOCONAZOLE 20 MG/ML
SHAMPOO, SUSPENSION TOPICAL
COMMUNITY
Start: 2024-03-05

## 2024-05-20 RX ORDER — HYDROCODONE BITARTRATE AND ACETAMINOPHEN 5; 325 MG/1; MG/1
1 TABLET ORAL EVERY 6 HOURS PRN
Qty: 12 TABLET | Refills: 0 | Status: SHIPPED | OUTPATIENT
Start: 2024-05-20 | End: 2024-05-24

## 2024-05-20 RX ORDER — CEFAZOLIN SODIUM 2 G/50ML
2 SOLUTION INTRAVENOUS ONCE
Status: DISCONTINUED | OUTPATIENT
Start: 2024-05-20 | End: 2024-05-20 | Stop reason: HOSPADM

## 2024-05-20 RX ADMIN — CEFAZOLIN SODIUM 2 G: 2 SOLUTION INTRAVENOUS at 05:05

## 2024-05-20 NOTE — PROGRESS NOTES
"Subjective:      Patient ID: Elisha Reynoso is a 73 y.o. female.    Vitals:  height is 5' 2" (1.575 m) and weight is 65.8 kg (145 lb). Her tympanic temperature is 98.7 °F (37.1 °C). Her blood pressure is 147/96 (abnormal) and her pulse is 86. Her respiration is 18 and oxygen saturation is 96%.     Chief Complaint: Fall     Patient is a 73 y.o. female who presents to urgent care with complaints of scratches to face and nose with nose pain after an injury at home prior to arrival.  Patient and has been were attempting to pull down a branch that had broken in a tree.  The branch fell and hit her in the back of the head, caused her to fall forward.  She then fell onto the branch after it had fallen onto the ground.  Hit her forehead and nose on the branch.  There is a superficial abrasion on the nose, there is bruising to the right side of the forehead, there is also hematoma noted to the left posterior occipital region.  This occurred 30 minutes prior to arrival.  She denies loss of consciousness, severe headache, nausea, vomiting, visual changes, focal weakness, numbness, tingling, confusion.  Denies any blood thinner use.  She is not up-to-date on tetanus.  She has requesting an x-ray of her nose to rule out fracture as this is what is causing her the most pain.  She denies any neck pain.      Musculoskeletal:  Positive for trauma.   Skin:  Positive for abrasion and bruising.      Objective:     Physical Exam   Constitutional: She is oriented to person, place, and time. She appears well-developed.   HENT:   Head: Normocephalic. Head is without abrasion, without contusion and without laceration.   Ears:   Right Ear: External ear normal.   Left Ear: External ear normal.   Nose: Nose lacerations and sinus tenderness present.      Comments: No bruising noted to the mastoid process however there is  bruising noted on the side of the nasal bridge, eyes  Mouth/Throat: Oropharynx is clear and moist and mucous membranes are " normal.   Eyes: Conjunctivae, EOM and lids are normal. Pupils are equal, round, and reactive to light.   Neck: Trachea normal and phonation normal. Neck supple. No neck rigidity present. No decreased range of motion present.   Cardiovascular: Normal rate, regular rhythm and normal heart sounds.   Pulmonary/Chest: Effort normal and breath sounds normal. No stridor. No respiratory distress.   Abdominal: Normal appearance.   Musculoskeletal: Normal range of motion.         General: Normal range of motion.      Cervical back: She exhibits no tenderness and no bony tenderness.   Neurological: She is alert and oriented to person, place, and time.   Skin: Skin is warm, dry, intact and no rash. Capillary refill takes less than 2 seconds. bruising No abrasion, No burn and No ecchymosis         Comments: Hematoma felt to the left posterior occipital region of the head, bruising noted to the right forehead, superficial abrasion with scant bloody drainage noted to the bridge of the nose.  Nose cleansed with Hibiclens solution, sterile water.  Mupirocin ointment and bandage applied.   Psychiatric: Her speech is normal and behavior is normal. Judgment and thought content normal.   Nursing note and vitals reviewed.  X-ray nasal bones:  Fracture identified, awaiting radiology over-read    Assessment:     1. Injury of nose, initial encounter    2. Skin abrasion        Plan:       Injury of nose, initial encounter  -     XR NASAL BONES; Future; Expected date: 05/20/2024    Skin abrasion  -     tetanus-diphther toxoids vaccine (PF) (TENIVAC) injection  -     mupirocin (BACTROBAN) 2 % ointment; Apply topically 3 (three) times daily. for 10 days  Dispense: 1 each; Refill: 1      Tetanus updated, mupirocin ointment sent in.  However x-ray shows nasal fracture.  Instructed due to patient's age and hitting her head or face hard enough to cause nasal fracture/ injury, she should present to the ER for further workup of the head, neck, face  for with CT imaging.  ER will decide further treatment.            As discussed, it is recommended that you present to the ER now for further evaluation to prevent a delay in care.     Opts for private transport via personal vehicle.

## 2024-05-20 NOTE — CONSULTS
"   Trauma Surgery   Activation Note    Patient Name: Elisha Reynoso  MRN: [id number]   YOB: 1950  Date: 05/20/2024    LEVEL 2 TRAUMA     Subjective:   History of present illness: Patient is an approximately 73 year old female presenting via POV s/p hit in face by tree limb and fall. - LOC. Was pulling broken branches down when it hit ground and came into face. Abrasions to face and nose, swelling to nose, and b periorbital ecchymosis. Went to walk in clinic and diagnosed with nasal fracture, received tdap and referred to er.     Primary Survey:  A patent   B Tony breath sounds    C 2+ radial and DP    D GCS 15(E 4, V 5, M 6)    E exposed, log-rolled and examined (see below)   F See below     VITAL SIGNS: 24 HR MIN & MAX LAST   Temp  Min: 98.2 °F (36.8 °C)  Max: 98.7 °F (37.1 °C)  98.2 °F (36.8 °C)   BP  Min: 147/96  Max: 162/101  (!) 161/112    Pulse  Min: 86  Max: 92  92    Resp  Min: 18  Max: 19  19    SpO2  Min: 96 %  Max: 100 %  100 %      HT: 5' 2" (157.5 cm)  WT: 61.2 kg (135 lb)  BMI: 24.7     FAST: deferred    Medications/transfusions received en-route: -  Medications/transfusions received in trauma bay: -    Scheduled Meds:   ceFAZolin (Ancef) IV (PEDS and ADULTS)  2 g Intravenous Once    HYDROcodone-acetaminophen  1 tablet Oral ED 1 Time     Continuous Infusions:   ceFAZolin (Ancef) IV (PEDS and ADULTS)   Intravenous Code/Trauma/sedation Continuous Med   2 g at 05/20/24 1743     PRN Meds:  Current Facility-Administered Medications:     ceFAZolin (Ancef) IV (PEDS and ADULTS), , Intravenous, Code/Trauma/sedation Continuous Med    ROS: 12 point ROS negative except as stated in HPI    Allergies: NKDA  PMH: Unknown  PSH: Unknown  Social history: Unknown  Objective:   Secondary Survey:   General: Well developed, well nourished, no acute distress, AAOx3  Neuro: CNII-XII grossly intact  HEENT:   PERRL, Abrasions to face and nose, swelling to nose, and b periorbital ecchymosis  CV:  RRR  Pulse: 2+ RP " b/l, 2+ DP b/l   Resp/chest:  Non-labored breathing, satting on room air  GI:  Abdomen soft, non-tender, non-distended  Extremities: Moves all 4 spontaneously and purposefully, no obvious gross deformities.  Back/Spine: No bony TTP, no palpable step offs or deformities.  Cervical back: Normal. No tenderness.  Thoracic back: Normal. No tenderness.  Lumbar back: Normal. No tenderness.  Skin/wounds:  Warm, well perfused, left shin wound from skin lesion removal   Psych: Normal mood and affect.    Labs:  Lab Results   Component Value Date    WBC 9.81 05/20/2024    HGB 15.1 05/20/2024    HCT 45.4 05/20/2024    MCV 86.8 05/20/2024     05/20/2024       BMP  Lab Results   Component Value Date     05/20/2024    K 4.2 05/20/2024    CO2 25 05/20/2024    BUN 16.2 05/20/2024    CREATININE 0.80 05/20/2024    CALCIUM 9.8 05/20/2024    EGFRNORACEVR >60 05/20/2024         Imaging:  Imaging Results              CT Head Without Contrast (Final result)  Result time 05/20/24 18:01:02      Final result by Holly Canela MD (05/20/24 18:01:02)                   Impression:      1. No acute intracranial abnormality.  2. Chronic microvascular ischemic changes.  3. Bilateral nasal bone fractures.      Electronically signed by: Holly Canela  Date:    05/20/2024  Time:    18:01               Narrative:    EXAMINATION:  CT HEAD WITHOUT CONTRAST    CLINICAL HISTORY:  Head trauma, minor (Age >= 65y);    TECHNIQUE:  Axial scans were obtained from skull base to the vertex.    Coronal and sagittal reconstructions obtained from the axial data.    Automatic exposure control was utilized to limit radiation dose.    Contrast: None    Radiation Dose:    Total DLP: 953 mGy*cm    COMPARISON:  None    FINDINGS:  There is no acute intracranial hemorrhage or edema. The gray-white matter differentiation is preserved.  Scattered hypodensities in the subcortical and periventricular white matter likely represent chronic microvascular  ischemic changes.    There is no mass effect or midline shift.  There is diffuse parenchymal volume loss.  The basal cisterns are patent. There is no abnormal extra-axial fluid collection.  Carotid artery calcifications are noted.    The calvarium and skull base are intact.  There are bilateral nasal bone fractures, mildly displaced on the right.                                        Assessment & Plan:   73 year old female s/p hit with tree branch   Outpatient evaluation for nasal bone Fx   Dispo per ED     Damaris Barriga [facility]   Trauma/Acute Care Surgery  [facility]  C: [phone]

## 2024-05-20 NOTE — ED PROVIDER NOTES
Encounter Date: 5/20/2024    SCRIBE #1 NOTE: I, Crissy Sin, am scribing for, and in the presence of,  Fidel Eden MD. I have scribed the following portions of the note - Other sections scribed: HPI, ROS, PE.       History     Chief Complaint   Patient presents with    Head Injury     73 year old female with a pmhx of breast cancer, skin cancer, and HLD presents to the ED as a level 2 trauma following a head injury that occurred PTA.  The patient reports that she was trying to get down a broken branch from a tree when it fell and hit her head.  The patient reports pain in her nose and a headache.  The patient denies nausea, neck pain, or any loss of consciousness.  A C-collar was deferred, and the patient received a tetanus prior to arrival at urgent care who referred the patient here.  The patient's PCP is Dr. Marco Antonio Armstrong.    The history is provided by the patient. No  was used.     Review of patient's allergies indicates:  No Known Allergies  Past Medical History:   Diagnosis Date    Breast cancer     Hyperlipidemia     Skin cancer      Past Surgical History:   Procedure Laterality Date    BREAST SURGERY      CATARACT EXTRACTION  07/2023    COLONOSCOPY  02/25/2015    Tommy Sharma MD    HYSTERECTOMY      SKIN CANCER EXCISION       Family History   Family history unknown: Yes     Social History     Tobacco Use    Smoking status: Never    Smokeless tobacco: Never   Substance Use Topics    Alcohol use: Never    Drug use: Never     Review of Systems   Constitutional:  Negative for fever.   HENT:  Negative for sore throat.         Nose pain   Respiratory:  Negative for shortness of breath.    Cardiovascular:  Negative for chest pain.   Gastrointestinal:  Negative for nausea.   Genitourinary:  Negative for dysuria.   Musculoskeletal:  Negative for back pain and neck pain.   Skin:  Negative for rash.   Neurological:  Positive for headaches. Negative for weakness.    Hematological:  Does not bruise/bleed easily.       Physical Exam     Initial Vitals [05/20/24 1740]   BP Pulse Resp Temp SpO2   (!) 161/112 92 19 98.2 °F (36.8 °C) 100 %      MAP       --         Physical Exam    Nursing note and vitals reviewed.  Constitutional: She appears well-developed and well-nourished.   HENT:   Head: Normocephalic.   Right Ear: External ear normal.   Left Ear: External ear normal.   Abrasion to the bridge of the nose  Abrasion/hematoma to the forehead   Abrasion/hematoma to the occipital scalp   Eyes: Conjunctivae and EOM are normal. Pupils are equal, round, and reactive to light.   Periorbital ecchymosis to the right eye   Neck: Neck supple.   Normal range of motion.  Cardiovascular:  Normal rate, regular rhythm and intact distal pulses.           Pulmonary/Chest: Breath sounds normal.   Abdominal: Abdomen is soft. Bowel sounds are normal.   Musculoskeletal:         General: Normal range of motion.      Cervical back: Normal range of motion and neck supple.     Neurological: She is alert and oriented to person, place, and time. GCS score is 15. GCS eye subscore is 4. GCS verbal subscore is 5. GCS motor subscore is 6.   Skin: Skin is warm and dry. Capillary refill takes less than 2 seconds.   Psychiatric: She has a normal mood and affect. Her behavior is normal. Judgment and thought content normal.         ED Course   Procedures  Labs Reviewed   COMPREHENSIVE METABOLIC PANEL - Abnormal; Notable for the following components:       Result Value    Protein Total 7.7 (*)     All other components within normal limits   CBC WITH DIFFERENTIAL - Abnormal; Notable for the following components:    MPV 11.1 (*)     All other components within normal limits   CBC W/ AUTO DIFFERENTIAL    Narrative:     The following orders were created for panel order CBC auto differential.  Procedure                               Abnormality         Status                     ---------                                -----------         ------                     CBC with Differential[[id number]]       Abnormal            Final result                 Please view results for these tests on the individual orders.          Imaging Results              CT Head Without Contrast (Final result)  Result time 05/20/24 18:01:02      Final result by Holly Canela MD (05/20/24 18:01:02)                   Impression:      1. No acute intracranial abnormality.  2. Chronic microvascular ischemic changes.  3. Bilateral nasal bone fractures.      Electronically signed by: Holly Canela  Date:    05/20/2024  Time:    18:01               Narrative:    EXAMINATION:  CT HEAD WITHOUT CONTRAST    CLINICAL HISTORY:  Head trauma, minor (Age >= 65y);    TECHNIQUE:  Axial scans were obtained from skull base to the vertex.    Coronal and sagittal reconstructions obtained from the axial data.    Automatic exposure control was utilized to limit radiation dose.    Contrast: None    Radiation Dose:    Total DLP: 953 mGy*cm    COMPARISON:  None    FINDINGS:  There is no acute intracranial hemorrhage or edema. The gray-white matter differentiation is preserved.  Scattered hypodensities in the subcortical and periventricular white matter likely represent chronic microvascular ischemic changes.    There is no mass effect or midline shift.  There is diffuse parenchymal volume loss.  The basal cisterns are patent. There is no abnormal extra-axial fluid collection.  Carotid artery calcifications are noted.    The calvarium and skull base are intact.  There are bilateral nasal bone fractures, mildly displaced on the right.                                       Medications   HYDROcodone-acetaminophen 5-325 mg per tablet 1 tablet (0 tablets Oral Hold 5/20/24 1745)   cefazolin (ANCEF) 2 gram in dextrose 5% 50 mL IVPB (premix) ( Intravenous Canceled Entry 5/20/24 1845)   cefazolin (ANCEF) 2 gram in dextrose 5% 50 mL IVPB (premix) (0 mg Intravenous Stopped  5/20/24 1811)   ceFAZolin (ANCEF) 1 gram injection (  Override Pull 5/20/24 1745)     Medical Decision Making  The differential diagnoses includes but is not limited to: contusion, abrasion, facial fracture, intracranial hemorrhage.      Amount and/or Complexity of Data Reviewed  Labs: ordered. Decision-making details documented in ED Course.  Radiology: ordered. Decision-making details documented in ED Course.    Risk  Prescription drug management.            Scribe Attestation:   Scribe #1: I performed the above scribed service and the documentation accurately describes the services I performed. I attest to the accuracy of the note.    Attending Attestation:           Physician Attestation for Scribe:  Physician Attestation Statement for Scribe #1: I, Fidel Eden MD, reviewed documentation, as scribed by Crissy Sin in my presence, and it is both accurate and complete.                                    Clinical Impression:  Final diagnoses:  [S09.90XA] Injury of head, initial encounter (Primary)  [S00.83XA] Contusion of forehead, initial encounter  [S02.2XXA] Closed fracture of nasal bone, initial encounter          ED Disposition Condition    Discharge Stable          ED Prescriptions       Medication Sig Dispense Start Date End Date Auth. Provider    HYDROcodone-acetaminophen (NORCO) 5-325 mg per tablet Take 1 tablet by mouth every 6 (six) hours as needed for Pain. 12 tablet 5/20/2024 5/23/2024 Fidel Eden MD          Follow-up Information       Follow up With Specialties Details Why Contact Info    Marco Antonio Armstrong MD Family Medicine   [address] [place] [address].  [address]  [facility] [address]  [phone]      Layo Pan MD Otolaryngology Schedule an appointment as soon as possible for a visit   [address]   [address]  [facility] [address]  [phone]               Fidel Eden MD  05/20/24 0640

## 2024-05-20 NOTE — ED NOTES
Assisted to sitting position. No tenderness, step offs, or deformities noted with palpation by Dr. Eden. No neuro changes noted.

## 2024-05-21 RX ORDER — FLUOCINONIDE TOPICAL SOLUTION USP, 0.05% 0.5 MG/ML
SOLUTION TOPICAL
COMMUNITY
Start: 2024-03-05

## 2024-05-21 RX ORDER — KETOCONAZOLE 20 MG/G
CREAM TOPICAL
COMMUNITY
Start: 2024-03-05

## 2024-05-24 ENCOUNTER — OFFICE VISIT (OUTPATIENT)
Dept: PRIMARY CARE CLINIC | Facility: CLINIC | Age: 74
End: 2024-05-24
Payer: MEDICARE

## 2024-05-24 VITALS
WEIGHT: 147.19 LBS | DIASTOLIC BLOOD PRESSURE: 89 MMHG | SYSTOLIC BLOOD PRESSURE: 134 MMHG | HEART RATE: 76 BPM | HEIGHT: 62 IN | BODY MASS INDEX: 27.08 KG/M2

## 2024-05-24 DIAGNOSIS — S02.2XXA CLOSED FRACTURE OF NASAL BONE, INITIAL ENCOUNTER: Primary | ICD-10-CM

## 2024-05-24 PROCEDURE — 99212 OFFICE O/P EST SF 10 MIN: CPT | Mod: ,,, | Performed by: GENERAL PRACTICE

## 2024-05-24 NOTE — PROGRESS NOTES
"Subjective:       Patient ID: Elisha Reynoso is a 73 y.o. female.    Chief Complaint: Follow-up    Established patient, presents to the clinic after recently being evaluated in urgent Care and ultimately the emergency room with a nasal bone fracture.  Injury occurred three days ago.  She did see Dr. Pan, an otolaryngologist.  She is breathing fine, she did not suffer with a septal hematoma, she is not having any problems, the bruising is getting better, there is no specific treatment that is necessary at this point.      Review of Systems   Constitutional: Negative.  Negative for fatigue and fever.   HENT:  Positive for facial swelling (Swelling, around the bridge of her nose). Negative for sore throat and trouble swallowing.    Eyes: Negative.  Negative for redness and visual disturbance.   Respiratory: Negative.  Negative for chest tightness and shortness of breath.    Cardiovascular: Negative.  Negative for chest pain.   Gastrointestinal: Negative.  Negative for abdominal pain, blood in stool and nausea.   Endocrine: Negative.  Negative for cold intolerance, heat intolerance and polyuria.   Genitourinary: Negative.    Musculoskeletal: Negative.  Negative for arthralgias, gait problem and joint swelling.   Integumentary:  Negative for rash.        Bruising, under her eyes, bridge of her nose   Neurological: Negative.  Negative for dizziness, weakness and headaches.   Psychiatric/Behavioral: Negative.  Negative for agitation and dysphoric mood.            Patient Care Team:  Marco Antonio Armstrong MD as PCP - General (Family Medicine)  Lila Ayala MD as Consulting Physician (Gynecology)  [facility], Tommy FONTANA MD as Consulting Physician (Gastroenterology)  Objective:     Vitals:    05/24/24 0959   BP: 134/89   Pulse: 76   Weight: 66.8 kg (147 lb 3.2 oz)   Height: 5' 2" (1.575 m)   Body mass index is 26.92 kg/m².     Physical Exam  Constitutional:       Appearance: Normal " appearance.   HENT:      Head:      Comments: Bruising, ecchymosis under her eyes and around her upper nose.     Nose:      Comments: Patent nares, swelling at the bridge of her nose, no significant deformity  Eyes:      Conjunctiva/sclera: Conjunctivae normal.   Skin:     General: Skin is warm and dry.   Neurological:      Mental Status: She is alert.   Psychiatric:         Mood and Affect: Mood normal.         Behavior: Behavior normal.           Assessment:       ICD-10-CM ICD-9-CM   1. Closed fracture of nasal bone, initial encounter  S02.2XXA 802.0       Current Outpatient Medications   Medication Instructions    co-enzyme Q-10 30 mg, Oral, 3 times daily    fluocinonide (LIDEX) 0.05 % external solution SMARTSI-2 Milliliter(s) Topical Twice Daily PRN    HYDROcodone-acetaminophen (NORCO) 5-325 mg per tablet 1 tablet, Oral, Every 6 hours PRN    ketoconazole (NIZORAL) 2 % cream Topical (Top)    ketoconazole (NIZORAL) 2 % shampoo Topical (Top), Twice weekly    mupirocin (BACTROBAN) 2 % ointment Topical (Top), 3 times daily    omega-3 fatty acids/fish oil (FISH OIL-OMEGA-3 FATTY ACIDS) 300-1,000 mg capsule Oral, Daily    rosuvastatin (CRESTOR) 10 mg, Oral, Daily    soy isofla/blk cohosh/mag bark (ESTROVEN ORAL) Oral, Daily    TUMERIC-GING-OLIVE-OREG-CAPRYL ORAL Oral     Plan:     Problem List Items Addressed This Visit          ENT    Closed fracture of nasal bones - Primary    Overview     She appears to be doing fine, this should heal without any issues.  Does not appear to have nasal or septal deviation, should not affect her breathing.  No specific treatment or intervention is necessary at this time.                    No follow-ups on file.

## 2024-08-29 ENCOUNTER — OFFICE VISIT (OUTPATIENT)
Dept: URGENT CARE | Facility: CLINIC | Age: 74
End: 2024-08-29
Payer: MEDICARE

## 2024-08-29 VITALS
DIASTOLIC BLOOD PRESSURE: 96 MMHG | TEMPERATURE: 98 F | HEART RATE: 84 BPM | BODY MASS INDEX: 27.23 KG/M2 | HEIGHT: 62 IN | OXYGEN SATURATION: 98 % | SYSTOLIC BLOOD PRESSURE: 159 MMHG | RESPIRATION RATE: 17 BRPM | WEIGHT: 148 LBS

## 2024-08-29 DIAGNOSIS — R30.0 DYSURIA: Primary | ICD-10-CM

## 2024-08-29 LAB
BILIRUB UR QL STRIP: NEGATIVE
GLUCOSE UR QL STRIP: NEGATIVE
KETONES UR QL STRIP: NEGATIVE
LEUKOCYTE ESTERASE UR QL STRIP: NEGATIVE
PH, POC UA: 5
POC BLOOD, URINE: NEGATIVE
POC NITRATES, URINE: NEGATIVE
PROT UR QL STRIP: NEGATIVE
SP GR UR STRIP: 1.01 (ref 1–1.03)
UROBILINOGEN UR STRIP-ACNC: 0.1 (ref 0.1–1.1)

## 2024-08-29 PROCEDURE — 87086 URINE CULTURE/COLONY COUNT: CPT | Performed by: PHYSICIAN ASSISTANT

## 2024-08-29 RX ORDER — NITROFURANTOIN 25; 75 MG/1; MG/1
100 CAPSULE ORAL 2 TIMES DAILY
Qty: 20 CAPSULE | Refills: 0 | Status: SHIPPED | OUTPATIENT
Start: 2024-08-29 | End: 2024-09-08

## 2024-08-29 NOTE — PROGRESS NOTES
"Subjective:      Patient ID: Elisha Reynoso is a 73 y.o. female.    Vitals:  height is 5' 2" (1.575 m) and weight is 67.1 kg (148 lb). Her temperature is 98.3 °F (36.8 °C). Her blood pressure is 159/96 (abnormal) and her pulse is 84. Her respiration is 17 and oxygen saturation is 98%.     Chief Complaint: Urinary Tract Infection    Female reports in the last 4 days having urinary frequency, urgency and dysuria presents to urgent Care for evaluation concern for urinary tract infection.      Patient reports home urinalysis test with positive leukocytes taking over-the-counter AZ0 with no relief.    Urinary Tract Infection   This is a new problem. Associated symptoms include frequency and urgency. Pertinent negatives include no flank pain, hematuria, nausea or vomiting.     Constitution: Negative for fever.   Gastrointestinal:  Negative for abdominal pain, nausea and vomiting.   Genitourinary:  Positive for dysuria, frequency and urgency. Negative for flank pain, bladder incontinence and hematuria.   Musculoskeletal:  Negative for back pain.   Skin: Negative.    Neurological:  Negative for numbness and tingling.   Psychiatric/Behavioral:  Negative for sleep disturbance.       Objective:     Physical Exam   Constitutional: She is oriented to person, place, and time. She appears well-developed. She is cooperative. No distress.      Comments:Awake alert pleasant ambulatory female     HENT:   Head: Normocephalic.   Neck: Neck supple.   Cardiovascular: Normal rate, regular rhythm and normal pulses.   Pulmonary/Chest: Effort normal.   Abdominal: Normal appearance. She exhibits no distension. Soft. flat abdomen There is no abdominal tenderness. There is no rebound, no guarding, no left CVA tenderness and no right CVA tenderness.   Musculoskeletal: Normal range of motion.         General: No swelling. Normal range of motion.   Neurological: no focal deficit. She is alert and oriented to person, place, and time. She has normal " "strength and normal reflexes. She displays no weakness.   Skin: Skin is warm, dry, intact, not diaphoretic, not pale and no rash. No lesion   Psychiatric: Her speech is normal.   Nursing note and vitals reviewed.         Previous History      Review of patient's allergies indicates:  No Known Allergies    Past Medical History:   Diagnosis Date    Breast cancer     Hyperlipidemia     Skin cancer      Current Outpatient Medications   Medication Instructions    co-enzyme Q-10 30 mg, Oral, 3 times daily    fluocinonide (LIDEX) 0.05 % external solution SMARTSI-2 Milliliter(s) Topical Twice Daily PRN    ketoconazole (NIZORAL) 2 % cream Topical (Top)    ketoconazole (NIZORAL) 2 % shampoo Topical (Top), Twice weekly    nitrofurantoin, macrocrystal-monohydrate, (MACROBID) 100 MG capsule 100 mg, Oral, 2 times daily    omega-3 fatty acids/fish oil (FISH OIL-OMEGA-3 FATTY ACIDS) 300-1,000 mg capsule Oral, Daily    rosuvastatin (CRESTOR) 10 mg, Oral, Daily    soy isofla/blk cohosh/mag bark (ESTROVEN ORAL) Oral, Daily    TUMERIC-GING-OLIVE-OREG-CAPRYL ORAL Oral     Past Surgical History:   Procedure Laterality Date    BREAST SURGERY      CATARACT EXTRACTION  2023    COLONOSCOPY  2015    Tommy Sharma MD    HYSTERECTOMY      SKIN CANCER EXCISION       Family History   Family history unknown: Yes       Social History     Tobacco Use    Smoking status: Never     Passive exposure: Never    Smokeless tobacco: Never   Substance Use Topics    Alcohol use: Never    Drug use: Never        Physical Exam      Vital Signs Reviewed   BP (!) 159/96   Pulse 84   Temp 98.3 °F (36.8 °C)   Resp 17   Ht 5' 2" (1.575 m)   Wt 67.1 kg (148 lb)   SpO2 98%   BMI 27.07 kg/m²        Procedures    Procedures     Labs     Results for orders placed or performed in visit on 24   POCT Urinalysis, Dipstick, Automated, W/O Scope   Result Value Ref Range    POC Blood, Urine Negative Negative    POC Bilirubin, Urine Negative Negative "    POC Urobilinogen, Urine 0.1 0.1 - 1.1    POC Ketones, Urine Negative Negative    POC Protein, Urine Negative Negative    POC Nitrates, Urine Negative Negative    POC Glucose, Urine Negative Negative    pH, UA 5     POC Specific Gravity, Urine 1.015 1.003 - 1.029    POC Leukocytes, Urine Negative Negative       Assessment:     1. Dysuria        Plan:   Discussed in clinic urinalysis concern for dysuria suspected early UTI with prescription antibiotic and urine culture for continued monitoring and any further antibiotic adjustment.    Recommend Macrobid antibiotic coverage for urinary tract infection concern.  May alternate Tylenol and ibuprofen every 6-8 hours if needed for pain or inflammation.  Encouraged plenty of water and noncarbonated fluids hydration and flushing of urinary tract system.  Recommend follow-up primary care physician next week for re-evaluation if not improving.    Dysuria  -     POCT Urinalysis, Dipstick, Automated, W/O Scope  -     Urine Culture High Risk    Other orders  -     nitrofurantoin, macrocrystal-monohydrate, (MACROBID) 100 MG capsule; Take 1 capsule (100 mg total) by mouth 2 (two) times daily. for 10 days  Dispense: 20 capsule; Refill: 0

## 2024-08-29 NOTE — PATIENT INSTRUCTIONS
Recommend Macrobid antibiotic coverage for urinary tract infection concern.  May alternate Tylenol and ibuprofen every 6-8 hours if needed for pain or inflammation.  Encouraged plenty of water and noncarbonated fluids hydration and flushing of urinary tract system.  Recommend follow-up primary care physician next week for re-evaluation if not improving.

## 2024-08-31 LAB — BACTERIA UR CULT: NORMAL

## 2024-10-30 ENCOUNTER — CLINICAL SUPPORT (OUTPATIENT)
Dept: PRIMARY CARE CLINIC | Facility: CLINIC | Age: 74
End: 2024-10-30
Payer: MEDICARE

## 2024-10-30 DIAGNOSIS — Z00.00 MEDICARE ANNUAL WELLNESS VISIT, SUBSEQUENT: ICD-10-CM

## 2024-10-30 DIAGNOSIS — R79.9 ABNORMAL BLOOD CHEMISTRY: ICD-10-CM

## 2024-10-30 DIAGNOSIS — M81.0 AGE-RELATED OSTEOPOROSIS WITHOUT CURRENT PATHOLOGICAL FRACTURE: Chronic | ICD-10-CM

## 2024-10-30 DIAGNOSIS — E78.5 DYSLIPIDEMIA: Chronic | ICD-10-CM

## 2024-10-30 DIAGNOSIS — D70.0 CONGENITAL NEUTROPENIA: ICD-10-CM

## 2024-10-30 LAB
ALBUMIN SERPL-MCNC: 4.2 G/DL (ref 3.4–4.8)
ALBUMIN/GLOB SERPL: 1.6 RATIO (ref 1.1–2)
ALP SERPL-CCNC: 69 UNIT/L (ref 40–150)
ALT SERPL-CCNC: 16 UNIT/L (ref 0–55)
ANION GAP SERPL CALC-SCNC: 4 MEQ/L
AST SERPL-CCNC: 16 UNIT/L (ref 5–34)
BASOPHILS # BLD AUTO: 0.04 X10(3)/MCL
BASOPHILS NFR BLD AUTO: 0.9 %
BILIRUB SERPL-MCNC: 0.6 MG/DL
BUN SERPL-MCNC: 11.7 MG/DL (ref 9.8–20.1)
CALCIUM SERPL-MCNC: 9.5 MG/DL (ref 8.4–10.2)
CHLORIDE SERPL-SCNC: 107 MMOL/L (ref 98–107)
CHOLEST SERPL-MCNC: 164 MG/DL
CHOLEST/HDLC SERPL: 3 {RATIO} (ref 0–5)
CO2 SERPL-SCNC: 28 MMOL/L (ref 23–31)
CREAT SERPL-MCNC: 0.79 MG/DL (ref 0.55–1.02)
CREAT/UREA NIT SERPL: 15
EOSINOPHIL # BLD AUTO: 0.11 X10(3)/MCL (ref 0–0.9)
EOSINOPHIL NFR BLD AUTO: 2.5 %
ERYTHROCYTE [DISTWIDTH] IN BLOOD BY AUTOMATED COUNT: 13.5 % (ref 11.5–17)
EST. AVERAGE GLUCOSE BLD GHB EST-MCNC: 105.4 MG/DL
GFR SERPLBLD CREATININE-BSD FMLA CKD-EPI: >60 ML/MIN/1.73/M2
GLOBULIN SER-MCNC: 2.6 GM/DL (ref 2.4–3.5)
GLUCOSE SERPL-MCNC: 90 MG/DL (ref 82–115)
HBA1C MFR BLD: 5.3 %
HCT VFR BLD AUTO: 44.6 % (ref 37–47)
HDLC SERPL-MCNC: 57 MG/DL (ref 35–60)
HGB BLD-MCNC: 15 G/DL (ref 12–16)
IMM GRANULOCYTES # BLD AUTO: 0.02 X10(3)/MCL (ref 0–0.04)
IMM GRANULOCYTES NFR BLD AUTO: 0.5 %
LDLC SERPL CALC-MCNC: 82 MG/DL (ref 50–140)
LYMPHOCYTES # BLD AUTO: 1.6 X10(3)/MCL (ref 0.6–4.6)
LYMPHOCYTES NFR BLD AUTO: 36 %
MCH RBC QN AUTO: 28.9 PG (ref 27–31)
MCHC RBC AUTO-ENTMCNC: 33.6 G/DL (ref 33–36)
MCV RBC AUTO: 85.9 FL (ref 80–94)
MONOCYTES # BLD AUTO: 0.47 X10(3)/MCL (ref 0.1–1.3)
MONOCYTES NFR BLD AUTO: 10.6 %
NEUTROPHILS # BLD AUTO: 2.2 X10(3)/MCL (ref 2.1–9.2)
NEUTROPHILS NFR BLD AUTO: 49.5 %
NRBC BLD AUTO-RTO: 0 %
PLATELET # BLD AUTO: 234 X10(3)/MCL (ref 130–400)
PMV BLD AUTO: 11.5 FL (ref 7.4–10.4)
POTASSIUM SERPL-SCNC: 4 MMOL/L (ref 3.5–5.1)
PROT SERPL-MCNC: 6.8 GM/DL (ref 5.8–7.6)
RBC # BLD AUTO: 5.19 X10(6)/MCL (ref 4.2–5.4)
SODIUM SERPL-SCNC: 139 MMOL/L (ref 136–145)
TRIGL SERPL-MCNC: 127 MG/DL (ref 37–140)
TSH SERPL-ACNC: 1.97 UIU/ML (ref 0.35–4.94)
VLDLC SERPL CALC-MCNC: 25 MG/DL
WBC # BLD AUTO: 4.44 X10(3)/MCL (ref 4.5–11.5)

## 2024-10-30 PROCEDURE — 36415 COLL VENOUS BLD VENIPUNCTURE: CPT

## 2024-10-30 PROCEDURE — 36415 COLL VENOUS BLD VENIPUNCTURE: CPT | Mod: ,,, | Performed by: GENERAL PRACTICE

## 2024-10-30 PROCEDURE — 80053 COMPREHEN METABOLIC PANEL: CPT | Performed by: GENERAL PRACTICE

## 2024-10-30 PROCEDURE — 83036 HEMOGLOBIN GLYCOSYLATED A1C: CPT | Performed by: GENERAL PRACTICE

## 2024-10-30 PROCEDURE — 84443 ASSAY THYROID STIM HORMONE: CPT | Performed by: GENERAL PRACTICE

## 2024-10-30 PROCEDURE — 85025 COMPLETE CBC W/AUTO DIFF WBC: CPT | Performed by: GENERAL PRACTICE

## 2024-10-30 PROCEDURE — 80061 LIPID PANEL: CPT | Performed by: GENERAL PRACTICE

## 2024-11-03 PROBLEM — R73.9 HYPERGLYCEMIA: Status: ACTIVE | Noted: 2024-11-03

## 2024-11-03 PROBLEM — S09.90XA HEAD INJURY: Status: RESOLVED | Noted: 2024-05-20 | Resolved: 2024-11-03

## 2024-11-03 PROBLEM — S02.2XXA CLOSED FRACTURE OF NASAL BONES: Status: RESOLVED | Noted: 2024-05-20 | Resolved: 2024-11-03

## 2024-11-03 NOTE — PROGRESS NOTES
Patient ID: [id number]     Chief Complaint: Annual Exam      HPI:     Elisha Reynoso is a 73 y.o. female here today for a Medicare Wellness. No other complaints today.       -------------------------------------    Breast cancer    Hyperlipidemia    Skin cancer        Past Surgical History:   Procedure Laterality Date    BREAST SURGERY      CATARACT EXTRACTION  2023    COLONOSCOPY  2015    Tommy Sharma MD    HYSTERECTOMY      SKIN CANCER EXCISION         Review of patient's allergies indicates:  No Known Allergies    Outpatient Medications Marked as Taking for the 24 encounter (Office Visit) with Marco Antonio Armstrong MD   Medication Sig Dispense Refill    co-enzyme Q-10 30 mg capsule Take 30 mg by mouth 3 (three) times daily.      DENTA 5000 PLUS 1.1 % Crea Take by mouth 2 (two) times daily.      fluocinonide (LIDEX) 0.05 % external solution SMARTSI-2 Milliliter(s) Topical Twice Daily PRN      ketoconazole (NIZORAL) 2 % cream Apply topically.      ketoconazole (NIZORAL) 2 % shampoo Apply topically twice a week.      soy isofla/blk cohosh/mag bark (ESTROVEN ORAL) Take by mouth Daily.      TUMERIC-GING-OLIVE-OREG-CAPRYL ORAL Take by mouth.      [DISCONTINUED] rosuvastatin (CRESTOR) 10 MG tablet Take 1 tablet (10 mg total) by mouth once daily. 90 tablet 3       Social History     Socioeconomic History    Marital status:    Tobacco Use    Smoking status: Never     Passive exposure: Never    Smokeless tobacco: Never   Substance and Sexual Activity    Alcohol use: Never    Drug use: Never        Family History   Family history unknown: Yes        Patient Care Team:  Marco Antonio Armstrong MD as PCP - General (Family Medicine)  Lila Ayala MD as Consulting Physician (Gynecology)  [facility] -  Tommy Sharma MD as Consulting Physician (Gastroenterology)     Opioid Screening: Patient medication list reviewed, patient is not taking prescription opioids. Patient is at  low risk of substance abuse based on this opioid use history.       Subjective:     Review of Systems   Constitutional: Negative.  Negative for malaise/fatigue and weight loss.   HENT: Negative.     Eyes: Negative.    Respiratory: Negative.  Negative for shortness of breath.    Cardiovascular: Negative.  Negative for chest pain.   Gastrointestinal: Negative.    Genitourinary: Negative.    Musculoskeletal: Negative.    Skin: Negative.    Neurological: Negative.  Negative for focal weakness, weakness and headaches.   Endo/Heme/Allergies: Negative.    Psychiatric/Behavioral: Negative.  Negative for depression and memory loss. The patient is not nervous/anxious.          Patient Reported Health Risk Assessment  [age] 70-79  Are you male or female?: Female  During the past four weeks, how much have you been bothered by emotional problems such as feeling anxious, depressed, irritable, sad, or downhearted and blue?: Not at all  During the past five weeks, has your physical and/or emotional health limited your social activities with family, friends, neighbors, or groups?: Not at all  During the past four weeks, how much bodily pain have you generally had?: No pain  During the past four weeks, was someone available to help if you needed and wanted help?: Yes, as much as I wanted  During the past four weeks, what was the hardest physical activity you could do for at least two minutes?: Light  Can you get to places out of walking distance without help?  (For example, can you travel alone on buses or taxis, or drive your own car?): Yes  Can you go shopping for groceries or clothes without someone's help?: Yes  Can you prepare your own meals?: Yes  Can you do your own housework without help?: Yes  Because of any health problems, do you need the help of another person with your personal care needs such as eating, bathing, dressing, or getting around the house?: No  Can you handle your own money without help?:  "Yes  During the past four weeks, how would you rate your health in general?: Good  How have things been going for you during the past four weeks?: Pretty well  Are you having difficulties driving your car?: No  Do you always fasten your seat belt when you are in a car?: Yes, usually  How often in the past four weeks have you been bothered by falling or dizzy when standing up?: Never  How often in the past four weeks have you been bothered by sexual problems?: Never  How often in the past four weeks have you been bothered by trouble eating well?: Never  How often in the past four weeks have you been bothered by teeth or denture problems?: Never  How often in the past four weeks have you been bothered with problems using the telephone?: Never  How often in the past four weeks have you been bothered by tiredness or fatigue?: Never  Have you fallen two or more times in the past year?: No  Are you afraid of falling?: No  Are you a smoker?: No  During the past four weeks, how many drinks of wine, beer, or other alcoholic beverages did you have?: One drink or less per week  Do you exercise for about 20 minutes three or more days a week?: No, I usually do not exercise this much  Have you been given any information to help you with hazards in your house that might hurt you?: No  Have you been given any information to help you with keeping track of your medications?: No  How often do you have trouble taking medicines the way you've been told to take them?: I always take them as prescribed  How confident are you that you can control and manage most of your health problems?: Very confident  What is your race? (Check all that apply.):     Objective:     /79   Pulse 79   Resp 18   Ht 5' 2" (1.575 m)   Wt 65.3 kg (144 lb)   SpO2 97%   BMI 26.34 kg/m²     Physical Exam  Constitutional:       Appearance: Normal appearance.   HENT:      Head: Normocephalic.      Mouth/Throat:      Mouth: Mucous membranes are " moist.      Pharynx: Oropharynx is clear.   Eyes:      Conjunctiva/sclera: Conjunctivae normal.      Pupils: Pupils are equal, round, and reactive to light.   Cardiovascular:      Rate and Rhythm: Normal rate and regular rhythm.      Heart sounds: Normal heart sounds.   Pulmonary:      Effort: Pulmonary effort is normal.      Breath sounds: Normal breath sounds.   Abdominal:      General: Abdomen is flat.      Palpations: Abdomen is soft.   Musculoskeletal:         General: Normal range of motion.      Cervical back: Neck supple.   Skin:     General: Skin is warm and dry.   Neurological:      General: No focal deficit present.      Mental Status: She is alert and oriented to person, place, and time.   Psychiatric:         Mood and Affect: Mood normal.         Behavior: Behavior normal.         Thought Content: Thought content normal.         Judgment: Judgment normal.         Lab Results   Component Value Date     10/30/2024    K 4.0 10/30/2024     10/30/2024    CO2 28 10/30/2024    BUN 11.7 10/30/2024    CREATININE 0.79 10/30/2024    CALCIUM 9.5 10/30/2024    ALBUMIN 4.2 10/30/2024    BILITOT 0.6 10/30/2024    ALKPHOS 69 10/30/2024    AST 16 10/30/2024    ALT 16 10/30/2024    EGFRNORACEVR >60 10/30/2024     Lab Results   Component Value Date    WBC 4.44 (L) 10/30/2024    RBC 5.19 10/30/2024    HGB 15.0 10/30/2024    HCT 44.6 10/30/2024    MCV 85.9 10/30/2024    RDW 13.5 10/30/2024     10/30/2024      Lab Results   Component Value Date    CHOL 164 10/30/2024    TRIG 127 10/30/2024    HDL 57 10/30/2024    LDL 82.00 10/30/2024    TOTALCHOLEST 3 10/30/2024     Lab Results   Component Value Date    TSH 1.970 10/30/2024     Lab Results   Component Value Date    HGBA1C 5.3 10/30/2024           No data to display                  11/4/2024    10:30 AM 5/24/2024    10:15 AM 10/30/2023    10:30 AM 7/6/2023     4:15 PM 10/25/2022    10:30 AM 8/29/2022     2:20 PM   Fall Risk Assessment - Outpatient    Mobility Status Ambulatory Ambulatory Ambulatory Ambulatory Ambulatory Ambulatory   Number of falls 0 0 0 0 0 0   Identified as fall risk False False False False False False           Depression Screening  Over the past two weeks, has the patient felt down, depressed, or hopeless?: No  Over the past two weeks, has the patient felt little interest or pleasure in doing things?: No  Functional Ability/Safety Screening  Was the patient's timed Up & Go test unsteady or longer than 30 seconds?: No  Does the patient need help with phone, transportation, shopping, preparing meals, housework, laundry, meds, or managing money?: No  Does the patient's home have rugs in the hallway, lack grab bars in the bathroom, lack handrails on the stairs or have poor lighting?: No  Have you noticed any hearing difficulties?: No  Cognitive Function (Assessed through direct observation with due consideration of information obtained by way of patient reports and/or concerns raised by family, friends, caretakers, or others)    Does the patient repeat questions/statements in the same day?: No  Does the patient have trouble remembering the date, year, and time?: No  Does the patient have difficulty managing finances?: No  Does the patient have a decreased sense of direction?: No  Assessment:       ICD-10-CM ICD-9-CM   1. Medicare annual wellness visit, subsequent  Z00.00 V70.0   2. Dyslipidemia  E78.5 272.4   3. Congenital neutropenia  D70.0 288.01   4. Age-related osteoporosis without current pathological fracture  M81.0 733.01   5. Hyperglycemia  R73.9 790.29        Plan:     Medicare Annual Wellness and Personalized Prevention Plan:   Fall Risk + Home Safety + Hearing Impairment + Depression Screen + Cognitive Impairment Screen + Health Risk Assessment all reviewed.       Health Maintenance Topics with due status: Not Due       Topic Last Completion Date    Colorectal Cancer Screening 02/25/2015    TETANUS VACCINE 05/20/2024    Lipid Panel  10/30/2024      The patient's Health Maintenance was reviewed and the following appears to be due at this time:   Health Maintenance Due   Topic Date Due    DEXA Scan  03/09/2023    Influenza Vaccine (1) 09/01/2024     Health risk assessment:  After review of the patient's medical record as it relates to health risk assessment over the next 5-10 years per recommendations of the USPSTF, it was determined that all pertinent recommendations have been appropriately addressed. The patient does not desire any further preventative care measures or screening tests at this time.  We will continue to reassess at each annual visit.    1. Medicare annual wellness visit, subsequent  Comments:  Overall health status was reviewed.  Good health habits reinforced.  Annual wellness exams recommended.  Orders:  -     TSH; Future; Expected date: 11/04/2025    2. Dyslipidemia  Overview:  Prescribed Crestor 10 mg daily, also takes Omega three fatty acids.    Most recent cholesterol/lipid blood testing shows adequate control, continue current treatment plan, including prescription medications if prescribed, and/or diet high in fiber, low in saturated fats as discussed during clinic visit.    Medication will be continued at current dosage.    Orders:  -     Lipid Panel; Future; Expected date: 11/03/2025  -     rosuvastatin (CRESTOR) 10 MG tablet; Take 1 tablet (10 mg total) by mouth once daily.  Dispense: 90 tablet; Refill: 3    3. Congenital neutropenia  Overview:  Due to multiple previous white blood cell counts that have been below normal, without obvious clinical findings or diagnostic reasons, the patient has been diagnosed with benign neutropenia, more than likely congenital in nature.  On occasion, white blood cell counts may be normal, but most previous counts have been lower than normal.  We will continue to monitor closely but there are no significant clinical concerns at this time.    Orders:  -     CBC Auto Differential;  Future; Expected date: 11/03/2025    4. Age-related osteoporosis without current pathological fracture  Overview:  Had bone scan done 2020, we will look for her results.  She was on medications for osteoporosis, probably Fosamax, she had a problem with a tooth, and the Fosamax was discontinued.    From last documented bone scan 2013:      The results are as follows:     HIP:  BMD is 0.731 and the T-score is -1.7   LUMBAR SPINE:  BMD is 0.689 and the T-score is -3.5     Patient was advised to:  -avoid smoking  -avoid excessive alcohol intake  -exercise every day  -it healthy balanced diet with lots of fruits, vegetables, calcium, and vitamins  -get at least 1200 mg of calcium daily  -get at least 800-1000 units of vitamin-D daily  -go outside for exposure to sun which helped her body absorbed vitamin-D into the blood stream      Orders:  -     Comprehensive Metabolic Panel; Future; Expected date: 11/03/2025  -     DXA Bone Density Axial Skeleton 1 or more sites; Future; Expected date: 11/04/2024    5. Hyperglycemia  Overview:  Patient has had elevated blood sugars without overt prediabetes or diabetes, with A1c levels at 5.5-5.6%.  Since patient is at risk for developing prediabetes or diabetes, we will check A1c levels annually.    Assessment & Plan:           Component Ref Range & Units 4 d ago 1 yr ago 2 yr ago 3 yr ago 4 yr ago   Hemoglobin A1c <=7.0 % 5.3 5.2 5.4 5.3 R 5.6 R   Estimated Average Glucose mg/dL 105.4 102.5 108.3 105.4 114.0          Orders:  -     Hemoglobin A1C; Future; Expected date: 11/03/2025            Advance Care Planning     Date: 11/03/2024    Living Will  During this visit, I engaged the patient  in the voluntary advance care planning process.  The patient and I reviewed the role for advance directives and their purpose in directing future healthcare if the patient's unable to speak for him/herself.  At this point in time, the patient does have full decision-making capacity.  We discussed  different extreme health states that she could experience, and reviewed what kind of medical care she would want in those situations.  The patient communicated that if she were comatose and had little chance of a meaningful recovery, she would not want machines/life-sustaining treatments used. In addition to the above preference, other important end-of-life issues for the patient include no other issues. The patient has completed a living will to reflect these preferences.  I spent a total of 5 minutes engaging the patient in this advance care planning discussion.              Current Outpatient Medications   Medication Instructions    co-enzyme Q-10 30 mg, 3 times daily    DENTA 5000 PLUS 1.1 % Crea 2 times daily    fluocinonide (LIDEX) 0.05 % external solution SMARTSI-2 Milliliter(s) Topical Twice Daily PRN    ketoconazole (NIZORAL) 2 % cream Apply topically.    ketoconazole (NIZORAL) 2 % shampoo Twice weekly    rosuvastatin (CRESTOR) 10 mg, Oral, Daily    soy isofla/blk cohosh/mag bark (ESTROVEN ORAL) Daily    TUMERIC-GING-OLIVE-OREG-CAPRYL ORAL Take by mouth.        Follow up in about 1 year (around 2025) for Medicare Wellness. In addition to their scheduled follow up, the patient has also been instructed to follow up on as needed basis.     This note was created with the assistance of [employer] voice recognition software or phone dictation. There may be transcription errors as a result of using this technology. Effort has been made to assure accuracy of transcription but any obvious errors or omissions should be clarified with the author of the document.

## 2024-11-03 NOTE — ASSESSMENT & PLAN NOTE
Component Ref Range & Units 4 d ago 1 yr ago 2 yr ago 3 yr ago 4 yr ago   Hemoglobin A1c <=7.0 % 5.3 5.2 5.4 5.3 R 5.6 R   Estimated Average Glucose mg/dL 105.4 102.5 108.3 105.4 114.0

## 2024-11-04 ENCOUNTER — OFFICE VISIT (OUTPATIENT)
Dept: PRIMARY CARE CLINIC | Facility: CLINIC | Age: 74
End: 2024-11-04
Payer: MEDICARE

## 2024-11-04 VITALS
OXYGEN SATURATION: 97 % | SYSTOLIC BLOOD PRESSURE: 116 MMHG | HEIGHT: 62 IN | BODY MASS INDEX: 26.5 KG/M2 | RESPIRATION RATE: 18 BRPM | HEART RATE: 79 BPM | WEIGHT: 144 LBS | DIASTOLIC BLOOD PRESSURE: 79 MMHG

## 2024-11-04 DIAGNOSIS — R73.9 HYPERGLYCEMIA: ICD-10-CM

## 2024-11-04 DIAGNOSIS — E78.5 DYSLIPIDEMIA: Chronic | ICD-10-CM

## 2024-11-04 DIAGNOSIS — D70.0 CONGENITAL NEUTROPENIA: Chronic | ICD-10-CM

## 2024-11-04 DIAGNOSIS — Z00.00 MEDICARE ANNUAL WELLNESS VISIT, SUBSEQUENT: Primary | ICD-10-CM

## 2024-11-04 DIAGNOSIS — M81.0 AGE-RELATED OSTEOPOROSIS WITHOUT CURRENT PATHOLOGICAL FRACTURE: Chronic | ICD-10-CM

## 2024-11-04 PROBLEM — H25.9 AGE-RELATED CATARACT OF RIGHT EYE: Chronic | Status: RESOLVED | Noted: 2023-07-06 | Resolved: 2024-11-04

## 2024-11-04 PROCEDURE — G0439 PPPS, SUBSEQ VISIT: HCPCS | Mod: ,,, | Performed by: GENERAL PRACTICE

## 2024-11-04 RX ORDER — ROSUVASTATIN CALCIUM 10 MG/1
10 TABLET, COATED ORAL DAILY
Qty: 90 TABLET | Refills: 3 | Status: SHIPPED | OUTPATIENT
Start: 2024-11-04

## 2024-11-04 RX ORDER — SODIUM FLUORIDE 1.1 G/100G
CREAM ORAL 2 TIMES DAILY
COMMUNITY
Start: 2024-10-07

## 2025-01-27 ENCOUNTER — TELEPHONE (OUTPATIENT)
Dept: PRIMARY CARE CLINIC | Facility: CLINIC | Age: 75
End: 2025-01-27
Payer: MEDICARE

## 2025-01-27 DIAGNOSIS — Z12.31 BREAST CANCER SCREENING BY MAMMOGRAM: Primary | ICD-10-CM

## 2025-01-27 NOTE — TELEPHONE ENCOUNTER
----- Message from Danay sent at 1/27/2025  9:56 AM CST -----  .Who Called: Elisha JOHAN Reynoso    Caller is requesting to schedule their annual mammogram appointment. Order is not listed in Epic. Please enter order and contact patient to schedule.    Where would they like the mammogram performed? [facility] center next door    Preferred Method of Contact: Phone Call  Patient's Preferred Phone Number on File: [phone]   Best Call Back Number, if different:  Additional Information: need orders fax

## 2025-02-18 ENCOUNTER — DOCUMENTATION ONLY (OUTPATIENT)
Dept: PRIMARY CARE CLINIC | Facility: CLINIC | Age: 75
End: 2025-02-18
Payer: MEDICARE

## 2025-02-18 LAB
BCS RECOMMENDATION EXT: NORMAL
BMD RECOMMENDATION EXT: NORMAL

## 2025-03-03 ENCOUNTER — DOCUMENTATION ONLY (OUTPATIENT)
Dept: PRIMARY CARE CLINIC | Facility: CLINIC | Age: 75
End: 2025-03-03
Payer: MEDICARE

## 2025-03-14 ENCOUNTER — TELEPHONE (OUTPATIENT)
Dept: PRIMARY CARE CLINIC | Facility: CLINIC | Age: 75
End: 2025-03-14
Payer: MEDICARE

## 2025-03-14 NOTE — TELEPHONE ENCOUNTER
----- Message from Rylie sent at 3/13/2025 11:54 AM CDT -----  Who Called: Argenis is requesting assistance/information from provider's office.Symptoms (please be specific):  How long has patient had these symptoms:  List of preferred pharmacies on file (remove unneeded): [unfilled]If different, enter pharmacy into here including location and phone number: Preferred Method of Contact: Phone CallPatient's Preferred Phone Number on File: [phone] Best Call Back Number, if different:Additional Information: Breast center called about fax sent

## 2025-03-21 DIAGNOSIS — D05.10 DUCTAL CARCINOMA IN SITU (DCIS) OF BREAST: Primary | ICD-10-CM

## 2025-03-31 ENCOUNTER — TELEPHONE (OUTPATIENT)
Dept: SURGERY | Facility: CLINIC | Age: 75
End: 2025-03-31
Payer: MEDICARE

## 2025-03-31 NOTE — PROGRESS NOTES
[facility] - [facility] [place] [facility]  Breast Surgical Oncology  New Patient Office Visit - H&P      Referring Provider: Dr. Marco Antonio Armstrong   PCP: Marco Antonio Armstrong MD     Patient Care Team:  Marco Antonio Armstrong MD as PCP - General (Family Medicine)  Lila Ayala MD as Consulting Physician (Gynecology)  [facility], Tommy FONTANA MD as Consulting Physician (Gastroenterology)  Nereyda Tim MD as Consulting Physician (Breast Surgery)      Chief Complaint:   Chief Complaint   Patient presents with    Breast Cancer     Patient reports no breast pain, swelling, nipple discharge/inversion        Subjective:   Treatment History:  None      Interval History:  None        HPI:  Elisha Reynoso is a 74 y.o. female who presents on 4/1/2025 for evaluation of newly diagnosed left  breast cancer. Gisela has a history of breast cancer diagnosed in 2001.    A detailed patient history was obtained and reviewed. She currently denies any breast issues including rashes, redness, pain, swelling, nipple discharge, or new lumps/masses.    I have reviewed: history from someone besides the patient, notes from other physicians, assessment of correct protocol, review of exclusion and inclusion criteria into clinical management protocol , surgical pathology results, and radiographic study evaluation      MG breast density: Category B - Scattered area of glandular/fibroglandular tissue    Imaging:  3/18/2025  BL SC MG at [facility]  IMPRESSION: NEEDS ADDITIONAL IMAGING  There  ia a 0.5 cm round focal asymmetry in the central retroareolar area of the L breast.    3/13/2025  L US BREAST COMPLETE AND AXILLA at [facility]  IMPRESSION: SUSPICIOUS FOR MALIGNANCY  There is a multilobulated mass with a smooth thin hypoechoic margin located in L breast at 12 o'clock 2 cm FN which appears to be an enlarged duct containing heterogeneous isoechoic and hypoechoic substance, possibly a solid mass.It measures 0.8 x 0.8  x 1.9 cm. There is no other significant findings.  The surgical scar is seen nearby at 1 o'clock 3 cm FN which has a stable benign appearance.          Oncology History   Malignant neoplasm of upper-outer quadrant of left breast in female, estrogen receptor positive          Pathology:  3/18/2025  Ultrasound-guided Core Needle Biopsy Left Breast 12 o'clock 2 cm FN  -Invasive ductal carcinoma, grade 2 (measuring 0.3 cm)  -Ductal carcinoma in situ, cribriform pattern, grade 2 with focal necrosis  ER 87%  [place] 0%  HER 2 maryuri 1+  Ki67 41.7%      OB/GYN History:  Age at Menarche Onset: 10    Menopausal Status: postmenopausal, LMP: No LMP recorded. Patient has had a hysterectomy.  Hysterectomy/Oophorectomy: hysterectomy with BSO, at age 51     Hormonal birth control (duration): Yes OCP (estrogen/progesterone).   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16 kg/m².     Allergy/Medications:   Review of patient's allergies indicates:  No Known Allergies     Current Medications[2]         Review of Systems:  Review of Systems   Constitutional:  Negative for chills, fever and weight loss.   HENT:  Positive for sinus pain.    Eyes:  Negative for blurred vision and double vision.  "  Respiratory:  Negative for cough and shortness of breath.    Cardiovascular:  Negative for chest pain, palpitations and leg swelling.   Gastrointestinal:  Positive for constipation and heartburn. Negative for abdominal pain, diarrhea, nausea and vomiting.   Genitourinary:  Positive for frequency and urgency. Negative for dysuria, flank pain and hematuria.   Musculoskeletal:  Negative for back pain, joint pain and myalgias.   Neurological:  Negative for dizziness and headaches.   Endo/Heme/Allergies:  Does not bruise/bleed easily.   Psychiatric/Behavioral:  Negative for depression. The patient is not nervous/anxious.           Objective:     Vitals:  Blood pressure (!) 129/91, pulse 76, temperature 98.7 °F (37.1 °C), temperature source Oral, resp. rate 18, height 5' 2" (1.575 m), weight 64.9 kg (143 lb), SpO2 98%.      Physical Exam:  Physical Exam   Constitutional: She is oriented to person, place, and time. She is cooperative.   Cardiovascular:  Normal rate, regular rhythm and normal heart sounds.            Pulmonary/Chest: Breath sounds normal. No respiratory distress. Right breast exhibits no inverted nipple, no mass, no nipple discharge, no skin change and no tenderness. Left breast exhibits inverted nipple (states nipple slowly inverted over the past 10 years) and skin change (incision in the UOQ consistent with previous lumpectomy. Resolving ecchymosis between 12 and 1 o'clock medial to the previous incision. Consistent with recent biopsy.). Left breast exhibits no mass, no nipple discharge and no tenderness. No breast swelling or bleeding. Breasts are symmetrical.       Abdominal: Soft. Normal appearance.   Genitourinary: No breast swelling or bleeding.   Musculoskeletal:      Right upper arm: Normal.      Left upper arm: Normal. Lymphadenopathy:      Cervical: No cervical adenopathy.      Right cervical: No superficial cervical adenopathy.     Left cervical: No superficial cervical adenopathy.      Upper " Body:      Right upper body: No supraclavicular or axillary adenopathy.      Left upper body: No supraclavicular or axillary adenopathy.     Neurological: She is alert and oriented to person, place, and time. She has normal motor skills, normal sensation and intact cranial nerves (2-12).   Skin: Skin is warm.     Psychiatric: Her behavior is normal. Mood, memory, judgment and thought content normal.          Assessment and Discussion:      Cancer Staging   Malignant neoplasm of upper-outer quadrant of left breast in female, estrogen receptor positive  Staging form: Breast, AJCC 8th Edition  - Clinical stage from 4/1/2025: Stage IA (cT1c, cN0, cM0, G2, ER+, AL-, HER2-) - Signed by Nereyda Tim MD on 4/2/2025        Encounter Diagnoses   Name Primary?    Malignant neoplasm of upper-outer quadrant of left breast in female, estrogen receptor positive         We discussed her imaging and pathology results in detail     We discussed the need to proceed with local and systemic treatment.      Local Surgical Treatment options:     Breast Surgical Procedures  Breast Conservation Surgery (Partial Mastectomy or Lumpectomy)  Palpation-Guided - removal of breast cancer which is felt on clinical exam  Localization-Guided - required placement of a MagSeed and/or wire (if needed) in the area of concern to allow for identification during surgery. This will take place prior to surgery (usually a few days before). During surgery the MagSeed is detected with a probe and the cancer area is removed along with the MagSeed and/or previously placed clip.   Incisions are usually closed with dissolving stitches, followed by glue and Dermabond.   Mastectomy  Simple - includes removal of breast skin, subcutaneous (fat) tissue, and nipple areolar complex.   Skin-Sparing - includes removal of the breast skin while sparing enough for reconstruction. It also is involves removal of the subcutaneous (fat) tissue and nipple areolar  "complex.  Nipple Sparing - includes removal of all the breast tissue underneath the nipple, areola, and breast skin is removed. The tissue beneath the nipple and areola are checked for cancer. If cancer is detected, the nipple and areola are then removed.   Surgical Risk include surgical site infections, hematoma or seroma requiring operation, necrosis of nipple-areola and/or mastectomy flaps requiring debridement or hyperbaric therapy, unplanned re-operations, and delay in adjuvant treatments.    Drain placement may be necessary after mastectomy and can remain in place for greater than 10-14 day, occasionally longer.     Axillary Surgical Procedures  [place] Lymph Node Biopsy  Technetium-99 - a clear substance injected around the nipple and areola on the day of surgery prior to surgery starting which provides a sound "road" map to the lymph nodes needing to be removed for further examination.  Blue Dye - either Methylene Blue or Isosulfan is given in the operating room and provides a color "road" map to the lymph nodes needing to be removed for further examination.  MagTrace (Superparamagnetic oxide) - is used to assist with lymph node mapping as well.  Risk - axillary swelling related to bleeding or serous fluid, infection, nerve damage (temporary or permanent), lymphedema (5-6% risk), additional surgery related to final pathology or complications from the procedure  Axillary Lymph Node Dissection  Risk including  the above plus nerve injury which could be permanent and lymphedema risk above 20-25%     Reconstruction Procedures  Implant- based  Autologous-based  Combination  Immediate versus Delayed  Oncoplastic reduction     The pros and cons of these reconstructive methods were addressed. A second operation maybe required before the final completion of the reconstructive process. I also explained to the patient that the reconstructive options are generally by law required to be covered by insurance and would be " considered part of a cancer operation and not a cosmetic operation.     Radiation Treatment Options:  Whole-Breast  Partial Breast      Systemic treatment options:  Hormone Blocker/Endocrine Therapy  Tamoxifen  Raloxifene  Aromatase Inhibitor   Letrozole (Femara)   Anastrozole (Arimidex)   Exemestane (Aromasin)     2. Chemotherapy     3. Immunomodulator & Target Therapies (such as Herceptin)        Given she has already completed radiation from previous breast cancer treatment recommended a mastectomy. Plan to remove the NAC and previous incision as well as previous lumpectomy bed.    Genetic Testing: was recommended and discussed. The risk and benefits of testing were discussed. The possible outcomes were also discussed.      Genetic predispositions conferring a high risk for breast cancer include hereditary breast and ovarian cancer (BRCA1/2), Li-Fraumeni syndrome (TP53), Peutz-Jeghers syndrome (STK11), Cowden syndrome (PTEN), and hereditary diffuse gastric cancer (CDH1).      Hereditary cancers are often characterized by gene mutations associated with a high probability of cancer development (ie, a high penetrance genotype), vertical transmission through either mother or father, and an association with other types of tumors. They often have an early age of onset and exhibit an autosomal-dominant inheritance pattern (ie, they occur when the individual has a germline mutation in only one copy of a gene).      Familial cancers share some but not all features of hereditary cancers. For example, although familial breast cancers occur in a given family more frequently than in the general population, they generally do not exhibit the inheritance patterns or age of onset consistent with hereditary cancers. Familial cancers may be associated with chance clustering of sporadic cancer cases within families, genetic variation in lower penetrance genes, a shared environment, or combinations of these factors.        Plan:      1. Malignant neoplasm of upper-outer quadrant of left breast in female, estrogen receptor positive  Assessment & Plan:  Surgery - recommend left simple mastectomy with left SLNB, possible ALND  Tentative Date: TBD  Plastic Surgery Referral - re: post-mastectomy reconstruction  Preop - CBC and CMP reviewed from 10/30/2024, need EKG  Surgical instructions and information were discussed in detail  Recommend genetic testing - re: second breast cancer    Orders:  -     Ambulatory referral/consult to Breast Surgery            All of questions were answered    Nereyda Tim MD  Breast Surgical Oncology     OFFICE VISIT CODING:    Non-face-to-face time included:  Yes Preparing to see the patient such as reviewing the patient record  Yes Obtaining and reviewing separately obtained history  Yes Independently interpreting results  Yes Documenting clinical information in electronic health record  No Ordering appropriate medications  Yes Ordering appropriate tests  Yes Ordering appropriate procedures (including follow-up)  Yes Referring and communicating with other health care professionals (not separately reported)  Yes Care Coordination (not separately reported)    Face-to-face time included:  Yes Performing a medically necessary appropriate history, examination, and/or evaluation  Yes Communicating results to the patient/family/caregiver  Yes Counseling and educating the patient/family/caregiver  Yes Answering patient/family/caregiver questions    Total Time: 65 minutes    Total time includes both face-to-face and non-face-to-face time personally spent by myself on the day of the visit.          [1]   Social History  Socioeconomic History    Marital status:    Tobacco Use    Smoking status: Never     Passive exposure: Never    Smokeless tobacco: Never   Substance and Sexual Activity    Alcohol use: Never    Drug use: Never   [2]   Current Outpatient Medications:     co-enzyme Q-10 30 mg capsule, Take 30 mg by  mouth 3 (three) times daily., Disp: , Rfl:     cyanocobalamin (VITAMIN B-12) 100 MCG tablet, Take 100 mcg by mouth once daily., Disp: , Rfl:     DENTA 5000 PLUS 1.1 % Crea, Take by mouth 2 (two) times daily., Disp: , Rfl:     fluocinonide (LIDEX) 0.05 % external solution, SMARTSI-2 Milliliter(s) Topical Twice Daily PRN, Disp: , Rfl:     ketoconazole (NIZORAL) 2 % cream, Apply topically., Disp: , Rfl:     ketoconazole (NIZORAL) 2 % shampoo, Apply topically twice a week., Disp: , Rfl:     magnesium oxide (MAG-OX) 400 mg (241.3 mg magnesium) tablet, Take 400 mg by mouth once daily., Disp: , Rfl:     rosuvastatin (CRESTOR) 10 MG tablet, Take 1 tablet (10 mg total) by mouth once daily., Disp: 90 tablet, Rfl: 3    soy isofla/blk cohosh/mag bark (ESTROVEN ORAL), Take by mouth Daily., Disp: , Rfl:     thiamine (VITAMIN B-1) 100 MG tablet, Take 100 mg by mouth once daily., Disp: , Rfl:     TUMERIC-GING-OLIVE-OREG-CAPRYL ORAL, Take by mouth., Disp: , Rfl:     vitamin D (VITAMIN D3) 1000 units Tab, Take 1,000 Units by mouth once daily., Disp: , Rfl:

## 2025-03-31 NOTE — TELEPHONE ENCOUNTER
Pre visit call made to patient. I introduced myself, and my role as navigator to the patient.  We spoke about her appointment with Dr. Tim on 4/1/2025, and what she can expect during her consultation. Patient verbalized understanding.

## 2025-04-01 ENCOUNTER — OFFICE VISIT (OUTPATIENT)
Dept: SURGERY | Facility: CLINIC | Age: 75
End: 2025-04-01
Payer: MEDICARE

## 2025-04-01 VITALS
WEIGHT: 143 LBS | DIASTOLIC BLOOD PRESSURE: 91 MMHG | SYSTOLIC BLOOD PRESSURE: 129 MMHG | BODY MASS INDEX: 26.31 KG/M2 | HEIGHT: 62 IN | TEMPERATURE: 99 F | RESPIRATION RATE: 18 BRPM | OXYGEN SATURATION: 98 % | HEART RATE: 76 BPM

## 2025-04-01 DIAGNOSIS — C50.412 MALIGNANT NEOPLASM OF UPPER-OUTER QUADRANT OF LEFT BREAST IN FEMALE, ESTROGEN RECEPTOR POSITIVE: ICD-10-CM

## 2025-04-01 DIAGNOSIS — Z17.0 MALIGNANT NEOPLASM OF UPPER-OUTER QUADRANT OF LEFT BREAST IN FEMALE, ESTROGEN RECEPTOR POSITIVE: ICD-10-CM

## 2025-04-01 PROCEDURE — 99215 OFFICE O/P EST HI 40 MIN: CPT | Mod: S$PBB,,, | Performed by: SURGERY

## 2025-04-01 PROCEDURE — 99215 OFFICE O/P EST HI 40 MIN: CPT | Mod: PBBFAC | Performed by: SURGERY

## 2025-04-01 PROCEDURE — 99999 PR PBB SHADOW E&M-EST. PATIENT-LVL V: CPT | Mod: PBBFAC,,, | Performed by: SURGERY

## 2025-04-01 RX ORDER — CHOLECALCIFEROL (VITAMIN D3) 25 MCG
1000 TABLET ORAL DAILY
COMMUNITY

## 2025-04-01 RX ORDER — PNV NO.95/FERROUS FUM/FOLIC AC 28MG-0.8MG
100 TABLET ORAL DAILY
COMMUNITY

## 2025-04-01 RX ORDER — LANOLIN ALCOHOL/MO/W.PET/CERES
100 CREAM (GRAM) TOPICAL DAILY
COMMUNITY

## 2025-04-01 RX ORDER — LANOLIN ALCOHOL/MO/W.PET/CERES
400 CREAM (GRAM) TOPICAL DAILY
COMMUNITY

## 2025-04-02 PROBLEM — C50.412 MALIGNANT NEOPLASM OF UPPER-OUTER QUADRANT OF LEFT BREAST IN FEMALE, ESTROGEN RECEPTOR POSITIVE: Status: ACTIVE | Noted: 2022-10-24

## 2025-04-02 PROBLEM — Z17.0 MALIGNANT NEOPLASM OF UPPER-OUTER QUADRANT OF LEFT BREAST IN FEMALE, ESTROGEN RECEPTOR POSITIVE: Status: ACTIVE | Noted: 2022-10-24

## 2025-04-02 NOTE — ASSESSMENT & PLAN NOTE
Surgery - recommend left simple mastectomy with left SLNB, possible ALND  Tentative Date: TBD  Plastic Surgery Referral - re: post-mastectomy reconstruction  Preop - CBC and CMP reviewed from 10/30/2024, need EKG  Surgical instructions and information were discussed in detail  Recommend genetic testing - re: second breast cancer

## 2025-04-03 NOTE — NURSING
Breast Nurse Navigator Note    Distress Screening Tool  Distress Score    Distress Score: 4    Met with patient after consult with Dr. Tim. Patient's  and niece  present during the consultation  Referred to the following outside resources: [employer] and [facility]. Verbalized understanding that these resources may provide support throughout the course of her treatment. Patient amenable to receiving additional support and gave her permission to be referred to these organizations.   Breast Cancer Education: Breast Cancer Binder and Nurse John given to the patient.   All questions answers to the patient's satisfaction.

## 2025-04-15 ENCOUNTER — TELEPHONE (OUTPATIENT)
Dept: PRIMARY CARE CLINIC | Facility: CLINIC | Age: 75
End: 2025-04-15
Payer: MEDICARE

## 2025-04-15 DIAGNOSIS — Z01.818 PRE-OPERATIVE CLEARANCE: Primary | ICD-10-CM

## 2025-04-15 NOTE — TELEPHONE ENCOUNTER
----- Message from Bonita sent at 4/15/2025  9:58 AM CDT -----  Regarding: appt  .Type:  Needs Medical AdviceWho Called: ptSymptoms (please be specific):  How long has patient had these symptoms:  Pharmacy name and phone #:  Would the patient rather a call back or a response via [facility]? Be Call Back Number:  [phone] Information: pt states Dr. Barrientos Vu surg cl appt . Has not heard back from office about appt . Surg riya for 5/5

## 2025-04-16 DIAGNOSIS — Z17.0 MALIGNANT NEOPLASM OF UPPER-OUTER QUADRANT OF LEFT BREAST IN FEMALE, ESTROGEN RECEPTOR POSITIVE: Primary | ICD-10-CM

## 2025-04-16 DIAGNOSIS — C50.412 MALIGNANT NEOPLASM OF UPPER-OUTER QUADRANT OF LEFT FEMALE BREAST: ICD-10-CM

## 2025-04-16 DIAGNOSIS — C50.412 MALIGNANT NEOPLASM OF UPPER-OUTER QUADRANT OF LEFT BREAST IN FEMALE, ESTROGEN RECEPTOR POSITIVE: Primary | ICD-10-CM

## 2025-04-16 RX ORDER — SODIUM CHLORIDE, SODIUM LACTATE, POTASSIUM CHLORIDE, CALCIUM CHLORIDE 600; 310; 30; 20 MG/100ML; MG/100ML; MG/100ML; MG/100ML
INJECTION, SOLUTION INTRAVENOUS CONTINUOUS
OUTPATIENT
Start: 2025-04-16

## 2025-04-16 RX ORDER — CEFAZOLIN SODIUM 2 G/50ML
2 SOLUTION INTRAVENOUS
OUTPATIENT
Start: 2025-04-16

## 2025-04-21 ENCOUNTER — TELEPHONE (OUTPATIENT)
Dept: PRIMARY CARE CLINIC | Facility: CLINIC | Age: 75
End: 2025-04-21
Payer: MEDICARE

## 2025-04-21 NOTE — TELEPHONE ENCOUNTER
----- Message from Bonita sent at 4/21/2025  8:17 AM CDT -----  Regarding: surg cl  .Type:  Needs Medical AdviceWho Called: [facility] - plastic surg. - JackSymptoms (please be specific):  How long has patient had these symptoms:  Pharmacy name and phone #:  Would the patient rather a call back or a response via [facility]? [facility] Back Number: [phone] Information: surg cl appt

## 2025-04-22 PROBLEM — Z01.818 PREOPERATIVE CLEARANCE: Status: ACTIVE | Noted: 2025-04-22

## 2025-04-22 NOTE — PROGRESS NOTES
Subjective:       Patient ID: Elisha Reynoso is a 74 y.o. female.    Chief Complaint: Pre-op Exam    Patient presents to the clinic for preoperative clearance for a left mastectomy due to a recent diagnosis of breast cancer.  Patient does not have any significant cardiac or pulmonary issues which would preclude this type of surgery involving general anesthesia.      Review of Systems   Constitutional: Negative.  Negative for fatigue and fever.   HENT: Negative.  Negative for sore throat and trouble swallowing.    Eyes: Negative.  Negative for redness and visual disturbance.   Respiratory: Negative.  Negative for chest tightness and shortness of breath.    Cardiovascular: Negative.  Negative for chest pain.   Gastrointestinal: Negative.  Negative for abdominal pain, blood in stool and nausea.   Endocrine: Negative.  Negative for cold intolerance, heat intolerance and polyuria.   Genitourinary: Negative.    Musculoskeletal: Negative.  Negative for arthralgias, gait problem and joint swelling.   Integumentary:  Negative for rash. Negative.   Neurological: Negative.  Negative for dizziness, weakness and headaches.   Psychiatric/Behavioral: Negative.  Negative for agitation and dysphoric mood.            Patient Care Team:  Marco Antonio Armstrong MD as PCP - General (Family Medicine)  Lila Ayala MD as Consulting Physician (Gynecology)  [facility], Tommy FONTANA MD as Consulting Physician (Gastroenterology)  Nereyda Tim MD as Consulting Physician (Breast Surgery)  Objective:   There were no vitals taken for this visit.     Physical Exam  Constitutional:       Appearance: Normal appearance.   HENT:      Head: Normocephalic.      Mouth/Throat:      Mouth: Mucous membranes are moist.      Pharynx: Oropharynx is clear.   Eyes:      Conjunctiva/sclera: Conjunctivae normal.      Pupils: Pupils are equal, round, and reactive to light.   Cardiovascular:      Rate and Rhythm: Normal rate  and regular rhythm.      Heart sounds: Normal heart sounds.   Pulmonary:      Effort: Pulmonary effort is normal.      Breath sounds: Normal breath sounds.   Abdominal:      General: Abdomen is flat.      Palpations: Abdomen is soft.   Musculoskeletal:         General: Normal range of motion.      Cervical back: Neck supple.   Skin:     General: Skin is warm and dry.   Neurological:      General: No focal deficit present.      Mental Status: She is alert and oriented to person, place, and time.   Psychiatric:         Mood and Affect: Mood normal.         Behavior: Behavior normal.         Thought Content: Thought content normal.         Judgment: Judgment normal.           Lab Results   Component Value Date     10/30/2024    K 4.0 10/30/2024     10/30/2024    CO2 28 10/30/2024    BUN 11.7 10/30/2024    CREATININE 0.79 10/30/2024    CALCIUM 9.5 10/30/2024    ALBUMIN 4.2 10/30/2024    BILITOT 0.6 10/30/2024    ALKPHOS 69 10/30/2024    AST 16 10/30/2024    ALT 16 10/30/2024    EGFRNORACEVR >60 10/30/2024     Lab Results   Component Value Date    WBC 4.44 (L) 10/30/2024    RBC 5.19 10/30/2024    HGB 15.0 10/30/2024    HCT 44.6 10/30/2024    MCV 85.9 10/30/2024    RDW 13.5 10/30/2024     10/30/2024      Lab Results   Component Value Date    CHOL 164 10/30/2024    TRIG 127 10/30/2024    HDL 57 10/30/2024    LDL 82.00 10/30/2024    TOTALCHOLEST 3 10/30/2024     Lab Results   Component Value Date    TSH 1.970 10/30/2024     Lab Results   Component Value Date    HGBA1C 5.3 10/30/2024      Preoperative labs including EKG and chest x-ray will be done after this visit.    Assessment:       ICD-10-CM ICD-9-CM   1. Preoperative clearance  Z01.818 V72.84   2. Malignant neoplasm of upper-outer quadrant of left breast in female, estrogen receptor positive  C50.412 174.4    Z17.0 V86.0       Current Outpatient Medications   Medication Instructions    co-enzyme Q-10 30 mg, 3 times daily    cyanocobalamin (VITAMIN  B-12) 100 mcg, Daily    DENTA 5000 PLUS 1.1 % Crea 2 times daily    fluocinonide (LIDEX) 0.05 % external solution SMARTSI-2 Milliliter(s) Topical Twice Daily PRN    ketoconazole (NIZORAL) 2 % cream Apply topically.    ketoconazole (NIZORAL) 2 % shampoo Twice weekly    magnesium oxide (MAG-OX) 400 mg, Daily    rosuvastatin (CRESTOR) 10 mg, Oral, Daily    soy isofla/blk cohosh/mag bark (ESTROVEN ORAL) Daily    thiamine (VITAMIN B-1) 100 mg, Daily    TUMERIC-GING-OLIVE-OREG-CAPRYL ORAL Take by mouth.    vitamin D (VITAMIN D3) 1,000 Units, Daily     Plan:     Problem List Items Addressed This Visit          Oncology    Malignant neoplasm of upper-outer quadrant of left breast in female, estrogen receptor positive    Overview   From breast surgeon:    Surgery - recommend left simple mastectomy with left SLNB, possible ALND  Tentative Date: TBD  Plastic Surgery Referral - re: post-mastectomy reconstruction  Preop - CBC and CMP reviewed from 10/30/2024, need EKG  Surgical instructions and information were discussed in detail  Recommend genetic testing - re: second breast cancer            Other    Preoperative clearance - Primary    Overview   All based on today's evaluation, there does not appear to be any contraindications to the patient undergoing surgery where general anesthesia will be used.  The patient is medically stable to proceed with surgery as planned.    Findings and final clearance today are based on medical history, physical examination, but is pending preoperative laboratory test evaluation.  As explained to the patient, medical clearance is not a guarantee that there will not be medical problems resulting from the surgery, I can only state that there are no discoverable medical reasons that this type of surgery using this method of anesthesia cannot proceed.                    Follow up for next appointment as scheduled or as needed.    This note was created with the assistance of MModal voice  recognition software or phone dictation. There may be transcription errors as a result of using this technology. Effort has been made to assure accuracy of transcription but any obvious errors or omissions should be clarified with the author of the document.

## 2025-04-23 ENCOUNTER — HOSPITAL ENCOUNTER (OUTPATIENT)
Dept: RADIOLOGY | Facility: HOSPITAL | Age: 75
Discharge: HOME | End: 2025-04-23
Attending: GENERAL PRACTICE
Payer: MEDICARE

## 2025-04-23 ENCOUNTER — RESULTS FOLLOW-UP (OUTPATIENT)
Dept: PRIMARY CARE CLINIC | Facility: CLINIC | Age: 75
End: 2025-04-23

## 2025-04-23 ENCOUNTER — OFFICE VISIT (OUTPATIENT)
Dept: PRIMARY CARE CLINIC | Facility: CLINIC | Age: 75
End: 2025-04-23
Payer: MEDICARE

## 2025-04-23 DIAGNOSIS — C50.412 MALIGNANT NEOPLASM OF UPPER-OUTER QUADRANT OF LEFT BREAST IN FEMALE, ESTROGEN RECEPTOR POSITIVE: ICD-10-CM

## 2025-04-23 DIAGNOSIS — Z01.818 PREOPERATIVE CLEARANCE: Primary | ICD-10-CM

## 2025-04-23 DIAGNOSIS — Z01.818 PRE-OPERATIVE CLEARANCE: ICD-10-CM

## 2025-04-23 DIAGNOSIS — Z17.0 MALIGNANT NEOPLASM OF UPPER-OUTER QUADRANT OF LEFT BREAST IN FEMALE, ESTROGEN RECEPTOR POSITIVE: ICD-10-CM

## 2025-04-23 PROCEDURE — 71046 X-RAY EXAM CHEST 2 VIEWS: CPT | Mod: TC

## 2025-05-04 ENCOUNTER — ANESTHESIA EVENT (OUTPATIENT)
Dept: SURGERY | Facility: HOSPITAL | Age: 75
End: 2025-05-04
Payer: MEDICARE

## 2025-05-05 ENCOUNTER — ANESTHESIA (OUTPATIENT)
Dept: SURGERY | Facility: HOSPITAL | Age: 75
End: 2025-05-05
Payer: MEDICARE

## 2025-05-05 ENCOUNTER — HOSPITAL ENCOUNTER (OUTPATIENT)
Dept: RADIOLOGY | Facility: HOSPITAL | Age: 75
Discharge: HOME | End: 2025-05-05
Attending: SURGERY
Payer: MEDICARE

## 2025-05-05 ENCOUNTER — HOSPITAL ENCOUNTER (OUTPATIENT)
Facility: HOSPITAL | Age: 75
Discharge: HOME | End: 2025-05-06
Attending: SURGERY | Admitting: SURGERY
Payer: MEDICARE

## 2025-05-05 DIAGNOSIS — C50.919 BREAST CANCER: ICD-10-CM

## 2025-05-05 DIAGNOSIS — C50.412 MALIGNANT NEOPLASM OF UPPER-OUTER QUADRANT OF LEFT FEMALE BREAST: ICD-10-CM

## 2025-05-05 DIAGNOSIS — R92.8 ABNORMAL MAMMOGRAM: ICD-10-CM

## 2025-05-05 DIAGNOSIS — C50.412 MALIGNANT NEOPLASM OF UPPER-OUTER QUADRANT OF LEFT BREAST IN FEMALE, ESTROGEN RECEPTOR POSITIVE: ICD-10-CM

## 2025-05-05 DIAGNOSIS — Z17.0 MALIGNANT NEOPLASM OF UPPER-OUTER QUADRANT OF LEFT BREAST IN FEMALE, ESTROGEN RECEPTOR POSITIVE: ICD-10-CM

## 2025-05-05 PROCEDURE — 63600175 PHARM REV CODE 636 W HCPCS: Performed by: SURGERY

## 2025-05-05 PROCEDURE — 88341 IMHCHEM/IMCYTCHM EA ADD ANTB: CPT

## 2025-05-05 PROCEDURE — 63600175 PHARM REV CODE 636 W HCPCS

## 2025-05-05 PROCEDURE — 36000707: Performed by: SURGERY

## 2025-05-05 PROCEDURE — 25000003 PHARM REV CODE 250: Performed by: NURSE ANESTHETIST, CERTIFIED REGISTERED

## 2025-05-05 PROCEDURE — 63600175 PHARM REV CODE 636 W HCPCS: Performed by: NURSE ANESTHETIST, CERTIFIED REGISTERED

## 2025-05-05 PROCEDURE — D9220A PRA ANESTHESIA: Mod: CRNA,,, | Performed by: NURSE ANESTHETIST, CERTIFIED REGISTERED

## 2025-05-05 PROCEDURE — 88305 TISSUE EXAM BY PATHOLOGIST: CPT | Performed by: SURGERY

## 2025-05-05 PROCEDURE — 19303 MAST SIMPLE COMPLETE: CPT | Mod: LT,,, | Performed by: SURGERY

## 2025-05-05 PROCEDURE — 88307 TISSUE EXAM BY PATHOLOGIST: CPT

## 2025-05-05 PROCEDURE — C1789 PROSTHESIS, BREAST, IMP: HCPCS | Performed by: SURGERY

## 2025-05-05 PROCEDURE — 88342 IMHCHEM/IMCYTCHM 1ST ANTB: CPT

## 2025-05-05 PROCEDURE — 63600175 PHARM REV CODE 636 W HCPCS: Performed by: ANESTHESIOLOGY

## 2025-05-05 PROCEDURE — 71000016 HC POSTOP RECOV ADDL HR: Performed by: SURGERY

## 2025-05-05 PROCEDURE — 71000015 HC POSTOP RECOV 1ST HR: Performed by: SURGERY

## 2025-05-05 PROCEDURE — 37000009 HC ANESTHESIA EA ADD 15 MINS: Performed by: SURGERY

## 2025-05-05 PROCEDURE — 36000706: Performed by: SURGERY

## 2025-05-05 PROCEDURE — C1819 TISSUE LOCALIZATION-EXCISION: HCPCS | Performed by: SURGERY

## 2025-05-05 PROCEDURE — 38525 BIOPSY/REMOVAL LYMPH NODES: CPT | Mod: 51,LT,, | Performed by: SURGERY

## 2025-05-05 PROCEDURE — 63600175 PHARM REV CODE 636 W HCPCS: Performed by: STUDENT IN AN ORGANIZED HEALTH CARE EDUCATION/TRAINING PROGRAM

## 2025-05-05 PROCEDURE — C1729 CATH, DRAINAGE: HCPCS | Performed by: SURGERY

## 2025-05-05 PROCEDURE — 38900 IO MAP OF SENT LYMPH NODE: CPT | Mod: LT,,, | Performed by: SURGERY

## 2025-05-05 PROCEDURE — 25000003 PHARM REV CODE 250: Performed by: SURGERY

## 2025-05-05 PROCEDURE — 27201423 OPTIME MED/SURG SUP & DEVICES STERILE SUPPLY: Performed by: SURGERY

## 2025-05-05 PROCEDURE — A9541 TC99M SULFUR COLLOID: HCPCS | Performed by: SURGERY

## 2025-05-05 PROCEDURE — 25000003 PHARM REV CODE 250: Performed by: STUDENT IN AN ORGANIZED HEALTH CARE EDUCATION/TRAINING PROGRAM

## 2025-05-05 PROCEDURE — 38792 RA TRACER ID OF SENTINL NODE: CPT | Mod: 51,XU,LT, | Performed by: SURGERY

## 2025-05-05 PROCEDURE — 71000033 HC RECOVERY, INTIAL HOUR: Performed by: SURGERY

## 2025-05-05 PROCEDURE — D9220A PRA ANESTHESIA: Mod: ANES,,, | Performed by: ANESTHESIOLOGY

## 2025-05-05 PROCEDURE — 19303 MAST SIMPLE COMPLETE: CPT | Mod: AS,LT,, | Performed by: PHYSICIAN ASSISTANT

## 2025-05-05 PROCEDURE — 76098 X-RAY EXAM SURGICAL SPECIMEN: CPT | Mod: TC

## 2025-05-05 PROCEDURE — 37000008 HC ANESTHESIA 1ST 15 MINUTES: Performed by: SURGERY

## 2025-05-05 DEVICE — IMPLANTABLE DEVICE: Type: IMPLANTABLE DEVICE | Site: BREAST | Status: FUNCTIONAL

## 2025-05-05 RX ORDER — GENTAMICIN 40 MG/ML
INJECTION, SOLUTION INTRAMUSCULAR; INTRAVENOUS
Status: DISCONTINUED | OUTPATIENT
Start: 2025-05-05 | End: 2025-05-05 | Stop reason: HOSPADM

## 2025-05-05 RX ORDER — CEFAZOLIN SODIUM 1 G/3ML
INJECTION, POWDER, FOR SOLUTION INTRAMUSCULAR; INTRAVENOUS
Status: DISPENSED
Start: 2025-05-05 | End: 2025-05-06

## 2025-05-05 RX ORDER — CEFAZOLIN SODIUM 1 G/3ML
2 INJECTION, POWDER, FOR SOLUTION INTRAMUSCULAR; INTRAVENOUS
Status: DISCONTINUED | OUTPATIENT
Start: 2025-05-05 | End: 2025-05-05

## 2025-05-05 RX ORDER — FAMOTIDINE 20 MG/1
20 TABLET, FILM COATED ORAL DAILY PRN
Status: DISCONTINUED | OUTPATIENT
Start: 2025-05-05 | End: 2025-05-06 | Stop reason: HOSPADM

## 2025-05-05 RX ORDER — BUPIVACAINE HYDROCHLORIDE 2.5 MG/ML
INJECTION, SOLUTION EPIDURAL; INFILTRATION; INTRACAUDAL; PERINEURAL
Status: DISPENSED
Start: 2025-05-05 | End: 2025-05-05

## 2025-05-05 RX ORDER — ISOSULFAN BLUE 50 MG/5ML
INJECTION, SOLUTION SUBCUTANEOUS
Status: DISPENSED
Start: 2025-05-05 | End: 2025-05-05

## 2025-05-05 RX ORDER — CEFAZOLIN SODIUM 2 G/50ML
2 SOLUTION INTRAVENOUS EVERY 8 HOURS
Status: DISCONTINUED | OUTPATIENT
Start: 2025-05-06 | End: 2025-05-06 | Stop reason: HOSPADM

## 2025-05-05 RX ORDER — PROPOFOL 10 MG/ML
VIAL (ML) INTRAVENOUS
Status: DISCONTINUED | OUTPATIENT
Start: 2025-05-05 | End: 2025-05-05

## 2025-05-05 RX ORDER — SODIUM CHLORIDE, SODIUM LACTATE, POTASSIUM CHLORIDE, CALCIUM CHLORIDE 600; 310; 30; 20 MG/100ML; MG/100ML; MG/100ML; MG/100ML
INJECTION, SOLUTION INTRAVENOUS CONTINUOUS
Status: DISCONTINUED | OUTPATIENT
Start: 2025-05-05 | End: 2025-05-06 | Stop reason: HOSPADM

## 2025-05-05 RX ORDER — ROCURONIUM BROMIDE 10 MG/ML
INJECTION, SOLUTION INTRAVENOUS
Status: DISCONTINUED | OUTPATIENT
Start: 2025-05-05 | End: 2025-05-05

## 2025-05-05 RX ORDER — CEFAZOLIN SODIUM 2 G/50ML
2 SOLUTION INTRAVENOUS
Status: DISCONTINUED | OUTPATIENT
Start: 2025-05-05 | End: 2025-05-05

## 2025-05-05 RX ORDER — TECHNETIUM TC 99M SULFUR COLLOID 2 MG
1 KIT MISCELLANEOUS
Status: COMPLETED | OUTPATIENT
Start: 2025-05-05 | End: 2025-05-05

## 2025-05-05 RX ORDER — HYDROMORPHONE HYDROCHLORIDE 2 MG/ML
0.4 INJECTION, SOLUTION INTRAMUSCULAR; INTRAVENOUS; SUBCUTANEOUS EVERY 5 MIN PRN
Status: ACTIVE | OUTPATIENT
Start: 2025-05-05 | End: 2025-05-05

## 2025-05-05 RX ORDER — MIDAZOLAM HYDROCHLORIDE 2 MG/2ML
INJECTION, SOLUTION INTRAMUSCULAR; INTRAVENOUS
Status: COMPLETED
Start: 2025-05-05 | End: 2025-05-05

## 2025-05-05 RX ORDER — BUPIVACAINE HYDROCHLORIDE 2.5 MG/ML
INJECTION, SOLUTION EPIDURAL; INFILTRATION; INTRACAUDAL; PERINEURAL
Status: DISCONTINUED | OUTPATIENT
Start: 2025-05-05 | End: 2025-05-05 | Stop reason: HOSPADM

## 2025-05-05 RX ORDER — NITROGLYCERIN 20 MG/G
OINTMENT TOPICAL
Status: DISPENSED
Start: 2025-05-05 | End: 2025-05-06

## 2025-05-05 RX ORDER — CEFAZOLIN SODIUM 1 G/3ML
INJECTION, POWDER, FOR SOLUTION INTRAMUSCULAR; INTRAVENOUS
Status: DISCONTINUED | OUTPATIENT
Start: 2025-05-05 | End: 2025-05-05 | Stop reason: HOSPADM

## 2025-05-05 RX ORDER — HYDRALAZINE HYDROCHLORIDE 20 MG/ML
10 INJECTION INTRAMUSCULAR; INTRAVENOUS ONCE
Status: DISCONTINUED | OUTPATIENT
Start: 2025-05-05 | End: 2025-05-05

## 2025-05-05 RX ORDER — GLUCAGON 1 MG
1 KIT INJECTION
Status: DISCONTINUED | OUTPATIENT
Start: 2025-05-05 | End: 2025-05-05

## 2025-05-05 RX ORDER — BUPIVACAINE 13.3 MG/ML
INJECTION, SUSPENSION, LIPOSOMAL INFILTRATION
Status: DISCONTINUED | OUTPATIENT
Start: 2025-05-05 | End: 2025-05-05 | Stop reason: HOSPADM

## 2025-05-05 RX ORDER — NITROGLYCERIN 20 MG/G
OINTMENT TOPICAL
Status: DISCONTINUED | OUTPATIENT
Start: 2025-05-05 | End: 2025-05-05 | Stop reason: HOSPADM

## 2025-05-05 RX ORDER — OXYCODONE AND ACETAMINOPHEN 5; 325 MG/1; MG/1
1 TABLET ORAL
Status: DISCONTINUED | OUTPATIENT
Start: 2025-05-05 | End: 2025-05-05

## 2025-05-05 RX ORDER — ACETAMINOPHEN 325 MG/1
650 TABLET ORAL EVERY 8 HOURS
Status: DISCONTINUED | OUTPATIENT
Start: 2025-05-05 | End: 2025-05-06 | Stop reason: HOSPADM

## 2025-05-05 RX ORDER — LIDOCAINE HYDROCHLORIDE 10 MG/ML
INJECTION, SOLUTION EPIDURAL; INFILTRATION; INTRACAUDAL; PERINEURAL
Status: DISCONTINUED | OUTPATIENT
Start: 2025-05-05 | End: 2025-05-05

## 2025-05-05 RX ORDER — CEFAZOLIN SODIUM 1 G/3ML
INJECTION, POWDER, FOR SOLUTION INTRAMUSCULAR; INTRAVENOUS
Status: DISPENSED
Start: 2025-05-05 | End: 2025-05-05

## 2025-05-05 RX ORDER — HALOPERIDOL LACTATE 5 MG/ML
0.5 INJECTION, SOLUTION INTRAMUSCULAR EVERY 10 MIN PRN
Status: DISCONTINUED | OUTPATIENT
Start: 2025-05-05 | End: 2025-05-05

## 2025-05-05 RX ORDER — METHOCARBAMOL 100 MG/ML
750 INJECTION, SOLUTION INTRAMUSCULAR; INTRAVENOUS ONCE AS NEEDED
Status: COMPLETED | OUTPATIENT
Start: 2025-05-05 | End: 2025-05-05

## 2025-05-05 RX ORDER — PHENYLEPHRINE HYDROCHLORIDE 10 MG/ML
INJECTION INTRAVENOUS
Status: DISCONTINUED | OUTPATIENT
Start: 2025-05-05 | End: 2025-05-05

## 2025-05-05 RX ORDER — BUPIVACAINE 13.3 MG/ML
INJECTION, SUSPENSION, LIPOSOMAL INFILTRATION
Status: DISPENSED
Start: 2025-05-05 | End: 2025-05-05

## 2025-05-05 RX ORDER — DEXAMETHASONE SODIUM PHOSPHATE 4 MG/ML
4 INJECTION, SOLUTION INTRA-ARTICULAR; INTRALESIONAL; INTRAMUSCULAR; INTRAVENOUS; SOFT TISSUE ONCE
Status: COMPLETED | OUTPATIENT
Start: 2025-05-05 | End: 2025-05-05

## 2025-05-05 RX ORDER — ISOSULFAN BLUE 50 MG/5ML
INJECTION, SOLUTION SUBCUTANEOUS
Status: DISCONTINUED | OUTPATIENT
Start: 2025-05-05 | End: 2025-05-05 | Stop reason: HOSPADM

## 2025-05-05 RX ORDER — ACETAMINOPHEN 500 MG
1000 TABLET ORAL
Status: DISCONTINUED | OUTPATIENT
Start: 2025-05-05 | End: 2025-05-05

## 2025-05-05 RX ORDER — MORPHINE SULFATE 4 MG/ML
2 INJECTION, SOLUTION INTRAMUSCULAR; INTRAVENOUS EVERY 4 HOURS PRN
Status: DISCONTINUED | OUTPATIENT
Start: 2025-05-05 | End: 2025-05-06 | Stop reason: HOSPADM

## 2025-05-05 RX ORDER — SCOPOLAMINE 1 MG/3D
1 PATCH, EXTENDED RELEASE TRANSDERMAL
Status: DISCONTINUED | OUTPATIENT
Start: 2025-05-05 | End: 2025-05-05 | Stop reason: HOSPADM

## 2025-05-05 RX ORDER — TRANEXAMIC ACID 1 G/10ML
INJECTION, SOLUTION INTRAVENOUS
Status: DISCONTINUED
Start: 2025-05-05 | End: 2025-05-05 | Stop reason: WASHOUT

## 2025-05-05 RX ORDER — MIDAZOLAM HYDROCHLORIDE 1 MG/ML
2 INJECTION INTRAMUSCULAR; INTRAVENOUS ONCE
Status: DISCONTINUED | OUTPATIENT
Start: 2025-05-05 | End: 2025-05-06 | Stop reason: HOSPADM

## 2025-05-05 RX ORDER — EPHEDRINE SULFATE 50 MG/ML
INJECTION, SOLUTION INTRAVENOUS
Status: DISCONTINUED | OUTPATIENT
Start: 2025-05-05 | End: 2025-05-05

## 2025-05-05 RX ORDER — ONDANSETRON HYDROCHLORIDE 2 MG/ML
INJECTION, SOLUTION INTRAMUSCULAR; INTRAVENOUS
Status: DISCONTINUED | OUTPATIENT
Start: 2025-05-05 | End: 2025-05-05

## 2025-05-05 RX ORDER — OXYCODONE HYDROCHLORIDE 5 MG/1
5 TABLET ORAL EVERY 4 HOURS PRN
Refills: 0 | Status: DISCONTINUED | OUTPATIENT
Start: 2025-05-05 | End: 2025-05-06 | Stop reason: HOSPADM

## 2025-05-05 RX ORDER — HEPARIN SODIUM 5000 [USP'U]/ML
5000 INJECTION, SOLUTION INTRAVENOUS; SUBCUTANEOUS ONCE
Status: COMPLETED | OUTPATIENT
Start: 2025-05-05 | End: 2025-05-05

## 2025-05-05 RX ORDER — ONDANSETRON HYDROCHLORIDE 2 MG/ML
4 INJECTION, SOLUTION INTRAVENOUS EVERY 6 HOURS PRN
Status: DISCONTINUED | OUTPATIENT
Start: 2025-05-05 | End: 2025-05-06 | Stop reason: HOSPADM

## 2025-05-05 RX ORDER — DOCUSATE SODIUM 100 MG/1
100 CAPSULE, LIQUID FILLED ORAL 2 TIMES DAILY
Status: DISCONTINUED | OUTPATIENT
Start: 2025-05-05 | End: 2025-05-06 | Stop reason: HOSPADM

## 2025-05-05 RX ORDER — FENTANYL CITRATE 50 UG/ML
INJECTION, SOLUTION INTRAMUSCULAR; INTRAVENOUS
Status: DISCONTINUED | OUTPATIENT
Start: 2025-05-05 | End: 2025-05-05

## 2025-05-05 RX ORDER — ONDANSETRON HYDROCHLORIDE 2 MG/ML
4 INJECTION, SOLUTION INTRAVENOUS ONCE
Status: COMPLETED | OUTPATIENT
Start: 2025-05-05 | End: 2025-05-05

## 2025-05-05 RX ORDER — TRANEXAMIC ACID 1 G/10ML
INJECTION, SOLUTION INTRAVENOUS
Status: DISPENSED
Start: 2025-05-05 | End: 2025-05-05

## 2025-05-05 RX ORDER — INDOCYANINE GREEN AND WATER 25 MG
KIT INJECTION
Status: DISCONTINUED | OUTPATIENT
Start: 2025-05-05 | End: 2025-05-05

## 2025-05-05 RX ORDER — KETOROLAC TROMETHAMINE 30 MG/ML
15 INJECTION, SOLUTION INTRAMUSCULAR; INTRAVENOUS EVERY 8 HOURS PRN
Status: DISCONTINUED | OUTPATIENT
Start: 2025-05-05 | End: 2025-05-05

## 2025-05-05 RX ORDER — SENNOSIDES 8.6 MG/1
8.6 TABLET ORAL DAILY
Status: DISCONTINUED | OUTPATIENT
Start: 2025-05-06 | End: 2025-05-06 | Stop reason: HOSPADM

## 2025-05-05 RX ORDER — GENTAMICIN 40 MG/ML
INJECTION, SOLUTION INTRAMUSCULAR; INTRAVENOUS
Status: DISPENSED
Start: 2025-05-05 | End: 2025-05-05

## 2025-05-05 RX ORDER — HYDROMORPHONE HYDROCHLORIDE 2 MG/ML
0.2 INJECTION, SOLUTION INTRAMUSCULAR; INTRAVENOUS; SUBCUTANEOUS EVERY 5 MIN PRN
Status: DISCONTINUED | OUTPATIENT
Start: 2025-05-05 | End: 2025-05-05

## 2025-05-05 RX ORDER — CYCLOBENZAPRINE HCL 5 MG
5 TABLET ORAL EVERY 8 HOURS PRN
Status: DISCONTINUED | OUTPATIENT
Start: 2025-05-05 | End: 2025-05-06 | Stop reason: HOSPADM

## 2025-05-05 RX ADMIN — SUGAMMADEX 120 MG: 100 INJECTION, SOLUTION INTRAVENOUS at 05:05

## 2025-05-05 RX ADMIN — ONDANSETRON 4 MG: 2 INJECTION INTRAMUSCULAR; INTRAVENOUS at 05:05

## 2025-05-05 RX ADMIN — FENTANYL CITRATE 25 MCG: 50 INJECTION, SOLUTION INTRAMUSCULAR; INTRAVENOUS at 05:05

## 2025-05-05 RX ADMIN — DEXTROSE MONOHYDRATE 1 G: 50 INJECTION, SOLUTION INTRAVENOUS at 04:05

## 2025-05-05 RX ADMIN — FENTANYL CITRATE 25 MCG: 50 INJECTION, SOLUTION INTRAMUSCULAR; INTRAVENOUS at 02:05

## 2025-05-05 RX ADMIN — SCOPOLAMINE 1 PATCH: 1 PATCH TRANSDERMAL at 11:05

## 2025-05-05 RX ADMIN — TECHNETIUM TC 99M SULFUR COLLOID 1 MILLICURIE: KIT at 02:05

## 2025-05-05 RX ADMIN — ONDANSETRON 4 MG: 2 INJECTION INTRAMUSCULAR; INTRAVENOUS at 11:05

## 2025-05-05 RX ADMIN — EPHEDRINE SULFATE 15 MG: 50 INJECTION INTRAVENOUS at 02:05

## 2025-05-05 RX ADMIN — PHENYLEPHRINE HYDROCHLORIDE 100 MCG: 10 INJECTION INTRAVENOUS at 12:05

## 2025-05-05 RX ADMIN — HEPARIN SODIUM 5000 UNITS: 5000 INJECTION, SOLUTION INTRAVENOUS; SUBCUTANEOUS at 11:05

## 2025-05-05 RX ADMIN — SODIUM CHLORIDE, POTASSIUM CHLORIDE, SODIUM LACTATE AND CALCIUM CHLORIDE: 600; 310; 30; 20 INJECTION, SOLUTION INTRAVENOUS at 12:05

## 2025-05-05 RX ADMIN — PROPOFOL 150 MG: 10 INJECTION, EMULSION INTRAVENOUS at 12:05

## 2025-05-05 RX ADMIN — SODIUM CHLORIDE: 9 INJECTION, SOLUTION INTRAVENOUS at 05:05

## 2025-05-05 RX ADMIN — DEXAMETHASONE SODIUM PHOSPHATE 4 MG: 4 INJECTION, SOLUTION INTRA-ARTICULAR; INTRALESIONAL; INTRAMUSCULAR; INTRAVENOUS; SOFT TISSUE at 12:05

## 2025-05-05 RX ADMIN — LIDOCAINE HYDROCHLORIDE 50 MG: 10 INJECTION, SOLUTION EPIDURAL; INFILTRATION; INTRACAUDAL; PERINEURAL at 12:05

## 2025-05-05 RX ADMIN — FENTANYL CITRATE 50 MCG: 50 INJECTION, SOLUTION INTRAMUSCULAR; INTRAVENOUS at 01:05

## 2025-05-05 RX ADMIN — METHOCARBAMOL 750 MG: 100 INJECTION INTRAMUSCULAR; INTRAVENOUS at 05:05

## 2025-05-05 RX ADMIN — ACETAMINOPHEN 1000 MG: 500 TABLET ORAL at 11:05

## 2025-05-05 RX ADMIN — ROCURONIUM BROMIDE 20 MG: 10 INJECTION, SOLUTION INTRAVENOUS at 01:05

## 2025-05-05 RX ADMIN — INDOCYANINE GREEN AND WATER 6.25 MG: KIT at 03:05

## 2025-05-05 RX ADMIN — DOCUSATE SODIUM 100 MG: 100 CAPSULE, LIQUID FILLED ORAL at 10:05

## 2025-05-05 RX ADMIN — MIDAZOLAM HYDROCHLORIDE 1 MG: 1 INJECTION, SOLUTION INTRAMUSCULAR; INTRAVENOUS at 12:05

## 2025-05-05 RX ADMIN — ACETAMINOPHEN 650 MG: 325 TABLET, FILM COATED ORAL at 10:05

## 2025-05-05 RX ADMIN — ROCURONIUM BROMIDE 50 MG: 10 INJECTION, SOLUTION INTRAVENOUS at 12:05

## 2025-05-05 RX ADMIN — FENTANYL CITRATE 50 MCG: 50 INJECTION, SOLUTION INTRAMUSCULAR; INTRAVENOUS at 12:05

## 2025-05-05 RX ADMIN — SODIUM CHLORIDE, POTASSIUM CHLORIDE, SODIUM LACTATE AND CALCIUM CHLORIDE: 600; 310; 30; 20 INJECTION, SOLUTION INTRAVENOUS at 01:05

## 2025-05-05 RX ADMIN — CEFAZOLIN 2 G: 330 INJECTION, POWDER, FOR SOLUTION INTRAMUSCULAR; INTRAVENOUS at 12:05

## 2025-05-05 NOTE — CARE UPDATE
Received patient from the OR, she is asleep, oral airway in place, chin lift not necessary at present-nurse at bedside with constant assessment and observation, respirations full-regular-deep-clear,hob up 30 degrees.

## 2025-05-05 NOTE — CARE UPDATE
Surgeon at bedside talking with patient and assessing patient and surgical site, noted RIGOBERTO drainage - see order for  cath removal once patient is more awake.

## 2025-05-05 NOTE — ANESTHESIA PREPROCEDURE EVALUATION
"                                                                                                             05/05/2025  Elisha Reynoso is a 74 y.o., female     Height: 5' 2" (1.575 m) (04/30/25)   Weight: 63.5 kg (140 lb) (04/30/25)   BMI: 25.6 (04/30/25)   NPO Status: Not recorded   Allergies: No Known Allergies     Pre Vitals  Current as of 05/05/25 1131  BP: 145/93 Pulse: 70   Resp: 16 SpO2: 96   Temp: 36.6 °C (97.9 °F)     Past Medical History   Breast cancer Hyperlipidemia   Skin cancer      Surgical History    COLONOSCOPY BREAST SURGERY   HYSTERECTOMY CATARACT EXTRACTION   SKIN CANCER EXCISION      Prescription Medications  Within last 14 days from 05/05/25   Last Taken Last Updated   co-enzyme Q-10 30 mg capsule    Past Month 05/05/25 1059   cyanocobalamin (VITAMIN B-12) 100 MCG tablet    Past Month 05/05/25 1059   DENTA 5000 PLUS 1.1 % Crea    5/4/2025 05/05/25 1059   fluocinonide (LIDEX) 0.05 % external solution    Past Week 05/05/25 1059   ketoconazole (NIZORAL) 2 % cream    Past Week 05/05/25 1117   ketoconazole (NIZORAL) 2 % shampoo    More than a month 05/05/25 1117   magnesium oxide (MAG-OX) 400 mg (241.3 mg magnesium) tablet    Past Month 05/05/25 1117   rosuvastatin (CRESTOR) 10 MG tablet    5/4/2025 05/05/25 1117   soy isofla/blk cohosh/mag bark (ESTROVEN ORAL)    Past Month 05/05/25 1117   thiamine (VITAMIN B-1) 100 MG tablet    Past Month 05/05/25 1117   TUMERIC-GING-OLIVE-OREG-CAPRYL ORAL    Past Month 05/05/25 1117   vitamin D (VITAMIN D3) 1000 units Tab    Past Month 05/05/25 1117      Latest Reference Range & Units 04/23/25 11:31   WBC 4.50 - 11.50 x10(3)/mcL 4.89   RBC 4.20 - 5.40 x10(6)/mcL 5.28   Hemoglobin 12.0 - 16.0 g/dL 15.0   Hematocrit 37.0 - 47.0 % 46.8   Platelet Count 130 - 400 x10(3)/mcL 239   PT 12.5 - 14.5 seconds 13.0   INR <=1.3  1.0   PTT 23.2 - 33.7 seconds 29.3   Sodium 136 - 145 mmol/L 139   Potassium 3.5 - 5.1 mmol/L 3.7   Chloride 98 - 107 mmol/L 105   CO2 23 - 31 " mmol/L 26   Anion Gap mEq/L 8.0   BUN 9.8 - 20.1 mg/dL 11.5   Creatinine 0.55 - 1.02 mg/dL 0.65   BUN/CREAT RATIO  18   eGFR mL/min/1.73/m2 >60   Glucose 82 - 115 mg/dL 80 (L)   Calcium 8.4 - 10.2 mg/dL 9.1   EKG 4/23/25   Normal sinus rhythm   Normal ECG     Pre-op Assessment    I have reviewed the Patient Summary Reports.     I have reviewed the Nursing Notes. I have reviewed the NPO Status.   I have reviewed the Medications.     Review of Systems  Anesthesia Hx:  No problems with previous Anesthesia   History of prior surgery of interest to airway management or planning:  Previous anesthesia: General, MAC        Denies Family Hx of Anesthesia complications.    Denies Personal Hx of Anesthesia complications.                    Social:  Non-Smoker, No Alcohol Use       Hematology/Oncology:    Oncology Normal    -- Denies Anemia:                                  EENT/Dental:  EENT/Dental Normal           Cardiovascular:  Exercise tolerance: good    Denies Hypertension.       Denies Angina.        ECG has been reviewed.                            Pulmonary:     Denies Asthma.   Denies Shortness of breath.   Denies Sleep Apnea.                Renal/:   Denies Chronic Renal Disease.                Hepatic/GI:      Denies GERD.    Not Taking GLP-1 Agonists            Musculoskeletal:  Musculoskeletal Normal                Neurological:    Denies CVA.                                    Endocrine:  Denies Diabetes.         Denies Obesity / BMI > 30  Dermatological:  Skin Normal    Psych:  Psychiatric Normal                    Physical Exam  General: Well nourished, Cooperative, Alert and Oriented    Airway:  Mallampati: II / I  Mouth Opening: Normal  TM Distance: Normal  Tongue: Normal  Neck ROM: Normal ROM    Dental:  Intact    Chest/Lungs:  Clear to auscultation, Normal Respiratory Rate    Heart:  Rate: Normal  Rhythm: Regular Rhythm  Sounds: Normal    Abdomen:  Normal, Soft, Nontender        Anesthesia Plan  Type of  Anesthesia, risks & benefits discussed:    Anesthesia Type: Gen ETT  Intra-op Monitoring Plan: Standard ASA Monitors  Post Op Pain Control Plan: multimodal analgesia and IV/PO Opioids PRN  Induction:  IV  Airway Plan: Direct, Post-Induction  Informed Consent: Informed consent signed with the Patient and all parties understand the risks and agree with anesthesia plan.  All questions answered.   ASA Score: 2  Day of Surgery Review of History & Physical: H&P Update referred to the surgeon/provider.    Ready For Surgery From Anesthesia Perspective.     .

## 2025-05-05 NOTE — CARE UPDATE
Patient awakened,casey,rejected oral airway, oriented/reassured her, she denied c/o, exchanging well.

## 2025-05-05 NOTE — TRANSFER OF CARE
"Anesthesia Transfer of Care Note    Patient: Elisha Reynoso    Procedure(s) Performed: Procedure(s) (LRB):  MASTECTOMY, SIMPLE / Left / Combo case with Dr. Alcantara (Left)  BIOPSY, LYMPH NODE, SENTINEL / Left / MagTrace vs NUC Med Blue Dye SentiMag if needed (Left)  INSERTION, BREAST IMPLANT / LEFT (Left)    Patient location: PACU    Anesthesia Type: general    Transport from OR: Transported from OR on room air with adequate spontaneous ventilation    Post pain: adequate analgesia    Post assessment: no apparent anesthetic complications    Post vital signs: stable    Level of consciousness: responds to stimulation    Nausea/Vomiting: no nausea/vomiting    Complications: none    Transfer of care protocol was followed      Last vitals: Visit Vitals  /70   Pulse 70   Temp 36.3 °C (97.3 °F)   Resp 15   Ht 5' 2" (1.575 m)   Wt 63.2 kg (139 lb 5.3 oz)   SpO2 98%   Breastfeeding No   BMI 25.48 kg/m²     "

## 2025-05-05 NOTE — OP NOTE
[facility] Surgical - Periop Services  Plastic Surgery  Operative Note    SUMMARY     Date of Procedure: 5/5/2025     Procedure: Procedure(s) (LRB):  MASTECTOMY, SIMPLE / Left / Combo case with Dr. Alcantara (Left)  BIOPSY, LYMPH NODE, SENTINEL / Left / MagTrace vs NUC Med Blue Dye SentiMag if needed (Left)  INSERTION, BREAST IMPLANT / LEFT (Left)       Surgeons and Role:  Panel 1:     * Nereyda Tim MD - Primary  Panel 2:     * Kierra Alcantara MD - Primary    Assisting Surgeon: None    Pre-Operative Diagnosis: Malignant neoplasm of upper-outer quadrant of left breast in female, estrogen receptor positive [C50.412, Z17.0]    Post-Operative Diagnosis: Post-Op Diagnosis Codes:     * Malignant neoplasm of upper-outer quadrant of left breast in female, estrogen receptor positive [C50.412, Z17.0]    Anesthesia: General    Operative Findings (including complications, if any): Adaquate perfusion of flaps with SPY    Description of Technical Procedures:  The patient and surgery site were verified.  Preoperative markings were performed.  The patient was taken back to the operating room and placed in a supine position.  SCDs were placed and turned on.  General anesthesia was induced and patient was intubated without any difficulty.  A sterile Blakely was placed without difficulty.  All pressure points were padded.  The arms were placed on 2 arm boards and secured with foam and Kerlix.  The chest was prepped and draped in usual surgical fashion.  Dr. Tim performed her portion of the procedure.  Please see her operative report for full dictation.  Breast weight was 270cc. After the left  nipple sparing mastectomy was performed.  The chest was re-prepped with chlorhexidine and draped with sterile drapes.  A 2nd time-out was performed.  Fluorescence angiography was performed with 5 cc of indocyanine green with a 10 cc flush.  This showed well perfused mastectomy flaps. The nipple had a small area of darkness.  The  laps that were previously placed were removed.  T     The left breast pocket was identified and there was even this of the flaps.  Hemostasis was achieved with the Bovie electrocautery.  The lateral breast flap was secured to the lateral chest wall with interrupted 2-0 PDS suture.  The base width was measured approximately 11 cm.  A medial stitch at the 9 o'clock was placed with a 2-0 PDS and brought it out externally. A second sitch was placed with 2-0 PDS at lower medial border.  Sizers were tried and patient was placed in a supine position.  The skin and nipple were tailor tacked.   It was decided on 265cc.  Exparel 20 cc, Marcaine 0.5% 30 cc, total 50 cc, 40 cc was used to perform a rib block and injected into the proposed drain site.  The implant was placed on the sterile field.  10 cc of hypochlorous acid was used to irrigate the breast implant.  The a sterile Rose was used in covered with a sterile Rose stand a rolled up towel and a 10 10 drape.  The implant was placed into the nest.  A medium thickness restore AlloDerm ADM was open and irrigated for 2 minutes in normal saline x 2 times.  This was placed over the implant with dermis side outwards.  This was secured together with running and interrupted 3-0 PDS sutures.  Gloves were changed.  The right breast pocket was irrigated with half-strength Betadine solution, antibiotic solution of vancomycin 1 g and gentamicin 80 mg, and Vashe hypochlorous acid.  TXA irrigation was also used. The 2 previous 3:00 a.m. and 9:00 a.m. PDS sutures were then sutured to the ADM.  The implant was then placed into the pocket and the sutures were tied down.  The remainder of the sutures along the inframammary fold was placed with interrupted 2-0 PDS.  A 19 Serbian channel Ankush drain was then placed into the pocket and brought out with a 15 blade stab incision and a hemostat through subcutaneous tissue.  This was secured with a 3-0 nylon suture.  The left breast tissue was then  closed with interrupted 3-0 monocryl sutures. The edges of the incision were freshened with a 15 blade and sent to pathology.  The skin was then closed with a running 4-0 Monocryl in a running deep dermal fashion. Then a 5-0 monocryl was used as  running subcuticular stitch.      A small amount of nitro placed was placed on the nipples.  The entire chest was cleaned with Vashe hypochlorous acid solution and cleaned with alcohol.  Xeroform and Telfa were placed over the incisions.  Biopatch and Telfa was placed around the drain site.  A eye gauze was cut out and placed over the nipple-areolar complex.    Tegaderm was were placed from the clavicle to the superior abdominal wall and to the mid axillary line.    The patient tolerated procedure well, was extubated in the OR and was transferred to recovery room in stable condition.  Blakely was kept in place.  All sponge, instrument, needle counts were correct at the end of the case.    Significant Surgical Tasks Conducted by the Assistant(s), if Applicable:     Estimated Blood Loss (EBL): 100 mL           Implants:   Implant Name Type Inv. Item Serial No.  Lot No. LRB No. Used Action   Single use sterile saline breast implant sizer     [id number] Left 1 Implanted and Explanted   Alloderm select restore tissue matrix perforated large    ALLERDERM [id number] Left 1 Implanted   Granger memorygel boost breast implant   [id number] MENTOR SERENA [id number] Left 1 Implanted       Specimens:   Specimen (24h ago, onward)       Start     Ordered    05/05/25 1655  Specimen to Pathology Breast  RELEASE UPON ORDERING        References:    Click here for ordering Quick Tip   Question:  Release to patient  Answer:  Immediate    05/05/25 1655    05/05/25 1457  Specimen to Pathology Breast  RELEASE UPON ORDERING        References:    Click here for ordering Quick Tip   Question:  Release to patient  Answer:  Immediate    05/05/25 1990                            Condition:  Good    Disposition: PACU - hemodynamically stable.    Attestation: I was present and scrubbed for the entire procedure.

## 2025-05-05 NOTE — ANESTHESIA PROCEDURE NOTES
Intubation    Date/Time: 5/5/2025 12:37 PM    Performed by: Rose Ayala CRNA  Authorized by: Nas Fuller MD    Intubation:     Induction:  Intravenous    Intubated:  Postinduction    Mask Ventilation:  Easy mask    Attempts:  1    Attempted By:  CRNA    Method of Intubation:  Direct    Blade:  Reynoso 2    Laryngeal View Grade: Grade I - full view of cords      Difficult Airway Encountered?: No      Complications:  None    Airway Device:  Oral endotracheal tube    Airway Device Size:  7.0    Style/Cuff Inflation:  Cuffed (inflated to minimal occlusive pressure)    Inflation Amount (mL):  3    Tube secured:  21    Secured at:  The lips    Placement Verified By:  Capnometry    Complicating Factors:  None    Findings Post-Intubation:  BS equal bilateral and atraumatic/condition of teeth unchanged

## 2025-05-05 NOTE — INTERVAL H&P NOTE
The patient has been examined and the H&P has been reviewed:    I concur with the findings and no changes have occurred since H&P was written.    Surgery risks, benefits and alternative options discussed and understood by patient/family.      All of questions were answered    Nereyda Tim MD  Breast Surgical Oncology

## 2025-05-06 PROCEDURE — 25000003 PHARM REV CODE 250: Performed by: STUDENT IN AN ORGANIZED HEALTH CARE EDUCATION/TRAINING PROGRAM

## 2025-05-06 PROCEDURE — 63600175 PHARM REV CODE 636 W HCPCS: Performed by: SURGERY

## 2025-05-06 RX ADMIN — CEFAZOLIN SODIUM 2 G: 2 SOLUTION INTRAVENOUS at 12:05

## 2025-05-06 RX ADMIN — OXYCODONE HYDROCHLORIDE 5 MG: 5 TABLET ORAL at 04:05

## 2025-05-06 RX ADMIN — CEFAZOLIN SODIUM 2 G: 2 SOLUTION INTRAVENOUS at 07:05

## 2025-05-06 RX ADMIN — ACETAMINOPHEN 650 MG: 325 TABLET, FILM COATED ORAL at 07:05

## 2025-05-06 NOTE — PROGRESS NOTES
[facility] Surgical - Med Surg  Plastic Surgery  Progress Note    Patient Name: Elisha Reynoso  MRN: [id number]  Patient Class: OP- Outpatient Recovery   Admission Date: 5/5/2025  Length of Stay: 0 days  Attending Physician: Nereyda Tim MD  Primary Care Provider: Marco Antonio Armstrong MD        Subjective:     Principal Problem:<principal problem not specified>    Interval History: No acute issues overnight. Pain controlled. Odalys PO, no n/v. Blakely removed and voiding without difficulty. Denies sob/cp    Review of Systems  Objective:     Vital Signs (Most Recent):  Temp: 98.2 °F (36.8 °C) (05/06/25 0712)  Pulse: 77 (05/06/25 0712)  Resp: 18 (05/06/25 0712)  BP: 110/66 (05/06/25 0712)  SpO2: (!) 94 % (05/06/25 0712) Vital Signs (24h Range):  Temp:  [97.6 °F (36.4 °C)-98.4 °F (36.9 °C)] 98.2 °F (36.8 °C)  Pulse:  [67-83] 77  Resp:  [14-18] 18  SpO2:  [94 %-99 %] 94 %  BP: (101-146)/(64-93) 110/66     Weight: 63.2 kg (139 lb 5.3 oz)  Body mass index is 25.48 kg/m².    Intake/Output Summary (Last 24 hours) at 5/6/2025 0742  Last data filed at 5/6/2025 0402  Gross per 24 hour   Intake 3600 ml   Output 3050 ml   Net 550 ml        AAO NAD  NC/AT  RR  Non-labored breathing  Left Breast with implant inplace. Mild ecchymosis of mastectomy flaps but overall healing well. No hematoma. Nipple pink. Dressings inplace. RIGOBERTO drain to suction and serosang out put. 70 cc overnight.  SCD on BLE    Assessment/Plan:   A/P: POD#1 s/p left 1st stage breast reconstruction with DTI    -Keep bra in place  -RIGOBERTO drain care  -HLIV  -Ok to shower tonight. Keep dressings in place  -Ambulate TID  -SCD BLE  -Ok for discharge home today. Follow up in one week in clinic. Patient has an appt.      There are no hospital problems to display for this patient.    VTE Risk Mitigation (From admission, onward)           Ordered     Place sequential compression device  Until discontinued         05/05/25 1050     IP VTE HIGH RISK PATIENT  Once          05/05/25 1050                       Kierra Alcantara MD  Plastic Surgery   [facility] Surgical - Med Surg

## 2025-05-06 NOTE — ANESTHESIA POSTPROCEDURE EVALUATION
Anesthesia Post Evaluation    Patient: Elisha Reynoso    Procedure(s) Performed: Procedure(s) (LRB):  MASTECTOMY, SIMPLE / Left / Combo case with Dr. Alcantara (Left)  BIOPSY, LYMPH NODE, SENTINEL / Left / MagTrace vs NUC Med Blue Dye SentiMag if needed (Left)  INSERTION, BREAST IMPLANT / LEFT (Left)    Final Anesthesia Type: general      Patient location during evaluation: PACU  Patient participation: Yes- Able to Participate  Level of consciousness: awake  Post-procedure vital signs: reviewed and stable  Pain management: adequate  Airway patency: patent    PONV status at discharge: vomiting (controlled) and nausea (controlled)  Anesthetic complications: no      Cardiovascular status: hemodynamically stable  Respiratory status: spontaneous ventilation and unassisted  Hydration status: euvolemic  Follow-up not needed.  Comments:                  Vitals Value Taken Time   /86 05/05/25 19:54   Temp 36.8 °C (98.2 °F) 05/05/25 18:20   Pulse 70 05/05/25 19:54   Resp 18 05/05/25 19:09   SpO2 96 % 05/05/25 19:54         Event Time   Out of Recovery 18:23:00         Pain/Kierra Score: Pain Rating Prior to Med Admin: 4 (5/6/2025  4:02 AM)  Pain Rating Post Med Admin: 0 (5/6/2025  5:02 AM)  Kierra Score: 10 (5/5/2025 10:00 PM)

## 2025-05-06 NOTE — DISCHARGE INSTRUCTIONS
BREAST SURGERY POST-OP INSTRUCTIONS  DR. ORESTES LAKHANI PA-C     How do I care for my incisions?  You and your caregiver should look at your incision daily. Call your doctor if you see any redness or drainage from your incision.  You will be given a support bra, wear this for 24 hours a day (unless showering or sponge bathing) for comfort and to help decrease amount of swelling. If the bra fits too loose or too tight you may go to your local store a purchase a front opening sports bra that fits snug.     Dr. Alcantara:  Okay to shower tomorrow. Remove outer dressing and bra but shower with tegaderm in place. Place surgical bra on after bathing.     If you go home with glue over your sutures (stitches), it will also loosen and peel off, similarly to the Steri-Strips.  ** If you have had a Mastectomy and/or Reconstruction and/or Axillary Lymph Node Dissection:  You may have 1-2 Migue-Pollard Drains in place. Please refer to the additional instructions discussing care of your drains.     Is it normal to feel new sensations?  As you are healing, you may feel a several different sensations in your breast. Tenderness, numbness, and twinges are common examples. These sensations usually come and go, and will lessen over time, usually within the first few months after surgery. As you continue to heal, you may feel scar tissue along your incision site. It will feel hard. This is common and will soften over the next several months.     Can I shower?  You can shower 24 hours after your surgery. Taking a warm shower is relaxing and can help decrease discomfort. Use soap when you shower and gently wash your incision. Pat the areas dry with a towel after showering, and leave your incision uncovered, unless you have drainage from your incision. If you have drainage, call your doctors office.  Do not take tub baths, swim, or use hot tubs or saunas until you discuss it with your doctor at the first appointment after  your surgery.     Will I have pain when I am home?  The length of time each person has pain or discomfort varies. You will be given a prescription for pain medication before you go home. Follow the guidelines below to manage your pain.  · Take your medication as directed and as needed.  · Call your doctor if the pain medication prescribed for you doesnt relieve your pain.  · Do not drive or drink alcohol while you are taking prescription pain medication.  · As your incision heals, you will have less pain and need less pain medication. A mild pain reliever such as acetaminophen (Tylenol) or ibuprofen (Advil) will relieve aches and discomfort. However, large quantities of acetaminophen may be harmful to your liver. Do not take more acetaminophen than the amount directed on the bottle or as instructed by your doctor or nurse.  · Pain medication should help you as you resume your normal activities. Pain medication is most effective 30 to 45 minutes after taking it.  · Keep track of when you take your pain medication. Taking it when your pain first begins is more effective than waiting for the pain to get worse.  Pain medication may cause constipation (having fewer bowel movements than what is normal for you).     How can I prevent constipation?  · Go to the bathroom at the same time every day. Your body will get used to going at that time.  · If you feel the urge to go, do not put it off. Try to use the bathroom 5 to 15 minutes after meals.  · After breakfast is a good time to move your bowels because the reflexes in your colon are strongest then.  · Exercise if you can; walking is an excellent form of exercise.  · Drink 8 (8-ounce) glasses (2 liters) of liquids daily, if you can. Drink water, juices, soups, ice cream shakes, and other drinks that do not have caffeine. Beverages with caffeine, such as coffee and soda, pull fluid out of the body.  · Slowly increase the fiber in your diet to 25 to 35 grams per day.  Fruits, vegetables, whole grains, and cereals contain fiber. If you have an ostomy or have had recent bowel surgery, check with your doctor or nurse before making any changes in your diet.  · Both over-the-counter and prescription medications are available to treat constipation. Start with 1 of the following over-the-counter medications first:  o Docusate sodium (Colace®) 100 mg. Take __1___ capsules __1___ time a day. This is a stool softener that causes few side effects. Do not take it with mineral oil.  o Polyethylene glycol (MiraLAX®) 17 grams daily.  o Senna (Senokot®) 2 tablets at bedtime. This is a stimulant laxative, which can cause cramping.  · If you havent had a bowel movement in 2 days, call your doctor or nurse.     Will I be able to eat?  You can resume eating when you go home after surgery. Eating a balanced diet high in protein will help you heal after surgery. Your diet should include a healthy protein source at each meal, as well as fruits, vegetables, and whole grains. If you have questions about your diet, ask to see a dietitian.     When is it safe for me to drive and what activities can I perform?  You may resume driving after surgery as long as you are not taking prescription pain medication that may make you drowsy, and you have your full range of motion.  You should not lift anything heavier than 10-15 lbs the first week. After this time, you may gradually increase the amount of weight. You want to take it easy the first 2 weeks, no strenuous or repetitive movements such as vacuuming or scrubbing. Walking is okay. Ask the doctor if you have questions.     How long until I have the pathology results?  The pathology report usually takes to 7 to 10 business days.     When is my first appointment after my surgery?  You should be given or schedule a follow-up appointment 1 to 2 weeks after your surgery.     How can I cope with my feelings?   After surgery for a serious illness, you may have new  and upsetting feelings. Many patients say they felt sad, worried, nervous, irritable, or angry at one time or another. You may find that you cannot control some of these feelings. If this happens, its a good idea to seek emotional support.  The first step in coping is to talk about how you feel. Family and friends can help. Your nurse, doctor, and  can reassure, support, and guide you. It is always a good idea to let these professionals know how you, your family, and your friends are feeling emotionally. Many resources are available to patients and their families. Whether you are in the hospital or at home, we are here to help you and your family and friends handle the emotional aspects of your illness.     What if I have other questions?  If you have any questions or concerns, please talk with your doctor or nurse. You can reach them Monday through Thursday from 9:00 AM to 5:00 PM and Friday from 9:00 AM to 12:00 PM at [phone].  After 5:00 PM M-Th or 12:00 PM Fri, during the weekend, and on holidays, please call [phone] and ask for the doctor on call.     PLEASE CALL YOUR DOCTOR OR NURSE IF YOU HAVE:  · A temperature of 101° F (38.3° C) or higher  · Shortness of breath  · Warmer than normal skin around your incision  · Increased discomfort in the area  · Increased redness around your incision  · New or increased swelling around your incision  · Discharge from your incision

## 2025-05-06 NOTE — OP NOTE
DATE OF PROCEDURE: 5/5/2025    SURGEON: Nereyda Tim M.D.    ASSISTANT:  Maria Antonia Mancuso PA-C    PREOPERATIVE DIAGNOSIS: Malignant neoplasm of upper-outer quadrant of left breast in female, estrogen receptor positive [C50.412, Z17.0]    POSTOPERATIVE DIAGNOSIS: Post-Op Diagnosis Codes:     * Malignant neoplasm of upper-outer quadrant of left breast in female, estrogen receptor positive [C50.412, Z17.0]     ANESTHESIA: General Anesthesiologist: Robles Munguia Jr., MD; Nas Fuller MD; Johan Cantrell MD  CRNA: Hua Crawford CRNA; Kate Alvarado CRNA; Carla Ayala CRNA; Rose Ayala CRNA     PROCEDURES PERFORMED:   1. Left breast nipple sparing mastectomy  2. Identification of left axillary sentinel lymph node  3. Left axillary deep sentinel lymph node biopsy   4. Injection of left breast with isosulfan Lymphazurin blue dye and technetium-labeled radiocolloid for sentinel lymph node identification intra-operatively    PROCEDURE IN DETAIL:   Elisha Reynoso is a 74 y.o. female brought to the operating room for definitive surgical management of left breast cancer. The patient has elected to undergo left nipple-sparing mastectomy with left sentinel lymph node biopsy for edilia assessment. The patient was informed of the possible risks and complications of the procedure, including but not limited to anesthetic risks, bleeding, infection, and need for additional surgery.  The patient concurred with the proposed plan, and has given informed consent.  The site of surgery was properly noted/marked in the preoperative holding area.    The patient was then brought to the operating room and placed in the supine position with both upper extremities extended.  General anesthesia was administered. Perioperative antibiotics were administered and a time out was performed confirming the patient, site, and procedure. The patient's left breast was injected with Radiotracer and Isosulfan blue dye  intra-operatively to facilitate sentinel lymph node identification. The bilateral chest and axilla was then prepped and draped in the usual sterile fashion.    The Left Nipple-Sparing Mastectomy  The left nipple-sparing mastectomy was performed. A large inframammary skin incision was made to left breast tissue sparing the nipple-areolar complex. Flaps were raised in the avascular plane between subcutaneous tissue and breast tissue was used for to raise flaps from the clavicle superiorly, the sternum medially, the anterior rectus sheath inferiorly, and the lateral border of the pectoralis major muscle laterally. Hemostasis was maintained in the flaps. Next, the breast tissue and underlying pectoralis fascia were excised from the pectoralis major muscle, progressing from medial to lateral. At the lateral border of the pectoralis major muscle, the breast tissue was sung laterally and a lateral pedicle identified where breast tissue gave way to fat of axilla. The lateral pedicle was incised, removed and the specimen was marked. Superiorly was a short suture and laterally was a long suture. The resulting mastectomy specimen was marked using a short stitch superiorly and a long stitch laterally. The breast was sent to pathology for permanent evaluation. The operative field was irrigated with normal saline and all bleeding points were secured with Bovie electrocautery.     Left [place] Lymph Node Biopsy was then performed  Using the hand-held gamma probe (TruNode) , activity was noted and localized in the left axilla. A small transverse inferior axillary incision over the area of activity. Dissection was carried down through the clavipectoral fascia using electrocautery. The lateral border of the pectoralis of the left axilla was excised and the axilla was entered. Using a hand-held gamma probe (TruNode)  the axilla was assessed for a sentinel lymph node. A Blue afferent lymphatic channel coming from the breast upper  outer quadrant to level 1 axillary lymph tissue. [place] lymph nodes were identified as follows:  Left axillary sentinel lymph node #1, Palpable  Left axillary sentinel lymph node #2, Palpable      The substances used for sentinel lymph node biopsy in this patient: Radiotracer and Isosulfan blue dye  Number of lymph nodes removed with MagTrace: 0  Number of lymph nodes removed with Isosulfan (or Methylene) blue dye and Radiotracer: 0  Number of lymph nodes removed with only Radiotracer signal: 0  Number of lymph nodes removed with only Isosulfan (or Methylene) blue dye: 0  Number of lymph nodes removed with only palpable: 2    The sentinel lymph nodes were not sent to Pathologist for intra-operative Frozen Section and permanent pathology review will be performed.    No further dissection was undertaken.     Drain placement and incision closer was completed by the plastic surgery service.     Disclaimer: This procedure required increased time and work to achieve the standard mastectomy. The entire breast tissue was removed from an incision on the lower outer breast which increased the complexity of the operation alone with measures to preserve the nipple areola complex. Nipple areola complex preservation goes above the standard simple mastectomy procedure.    Significant Surgical Tasks Conducted by the Assistant(s), if Applicable: The skilled assistance of the Physician Assistant, Maria Antonia Mancuso PA-C, was necessary for the successful completion of this case. She was essential for proper positioning of the patient, manipulation of instruments, proper exposure, manipulation of tissue, and wound closure.       ESTIMATED BLOOD LOSS: 25 ml    Implants:   Implant Name Type Inv. Item Serial No.  Lot No. LRB No. Used Action   Single use sterile saline breast implant sizer     [id number] Left 1 Implanted and Explanted   Alloderm select restore tissue matrix perforated large    ALLERDERM [id number] Left 1  Implanted   [place] memorygel boost breast implant   [id number] [place] SERENA [id number] Left 1 Implanted       Specimens:   Specimen (24h ago, onward)       Start     Ordered    05/05/25 1655  Specimen to Pathology Breast  RELEASE UPON ORDERING        References:    Click here for ordering Quick Tip   Question:  Release to patient  Answer:  Immediate    05/05/25 1655    05/05/25 1457  Specimen to Pathology Breast  RELEASE UPON ORDERING        References:    Click here for ordering Quick Tip   Question:  Release to patient  Answer:  Immediate    05/05/25 1457                   ID Type Source Tests Collected by Time Destination   A : Left nipple-sparing mastectomy; short stitch superior, long stitch lateral, double stitch nipple Tissue Breast, Left SPECIMEN TO PATHOLOGY Nereyda Tim MD 5/5/2025 1412    B : Left axillary sentinel lymph node biopsy #1; palpable Tissue Lymph Node SPECIMEN TO PATHOLOGY Nereyda Tim MD 5/5/2025 1440    C : Left axillary sentinel lymph node biopsy #2: palpable Tissue Lymph Node SPECIMEN TO PATHOLOGY Nereyda Tim MD 5/5/2025 1444    D :  Skin Breast, Left SPECIMEN TO PATHOLOGY Kierra Alcantara MD 5/5/2025 1651                 Condition: Good    Disposition: PACU - hemodynamically stable.    Attestation: I performed the procedure.    [place] Node Biopsy for Breast Cancer - Left  Operation performed with curative intent Yes   Tracer(s) used to identify sentinel nodes in the upfront surgery (non-neoadjuvant) setting Dye and Radioactive tracer   Tracer(s) used to identify sentinel nodes in the neoadjuvant setting N/A   All nodes (colored or non-colored) present at the end of a dye-filled lymphatic channel were removed Yes   All significantly radioactive nodes were removed Yes   All palpably suspicious nodes were removed Yes   Biopsy-proven positive nodes marked with clips prior to chemotherapy were identified and removed N/A

## 2025-05-06 NOTE — PLAN OF CARE
Problem: Fall Injury Risk  Goal: Absence of Fall and Fall-Related Injury  Outcome: Progressing  Intervention: Identify and Manage Contributors  Flowsheets (Taken 5/5/2025 1930)  Self-Care Promotion: BADL personal objects within reach  Medication Review/Management: medications reviewed  Intervention: Promote Injury-Free Environment  Flowsheets (Taken 5/5/2025 1930)  Safety Promotion/Fall Prevention:   side rails raised x 2   patient expresses understanding of fall risk and prevention   nonskid shoes/socks when out of bed   lighting adjusted   family to remain at bedside   assistive device/personal item within reach     Problem: Infection  Goal: Absence of Infection Signs and Symptoms  Outcome: Progressing     Problem: Pain Acute  Goal: Optimal Pain Control and Function  Outcome: Progressing     Problem: Adult Inpatient Plan of Care  Goal: Plan of Care Review  Outcome: Progressing  Goal: Patient-Specific Goal (Individualized)  Outcome: Progressing  Goal: Absence of Hospital-Acquired Illness or Injury  Outcome: Progressing  Goal: Optimal Comfort and Wellbeing  Outcome: Progressing  Goal: Readiness for Transition of Care  Outcome: Progressing     Problem: Wound  Goal: Optimal Coping  Outcome: Progressing  Goal: Optimal Functional Ability  Outcome: Progressing  Goal: Absence of Infection Signs and Symptoms  Outcome: Progressing  Goal: Improved Oral Intake  Outcome: Progressing  Goal: Optimal Pain Control and Function  Outcome: Progressing  Goal: Skin Health and Integrity  Outcome: Progressing  Goal: Optimal Wound Healing  Outcome: Progressing

## 2025-05-07 VITALS
HEIGHT: 62 IN | OXYGEN SATURATION: 94 % | BODY MASS INDEX: 25.64 KG/M2 | DIASTOLIC BLOOD PRESSURE: 66 MMHG | HEART RATE: 77 BPM | RESPIRATION RATE: 18 BRPM | SYSTOLIC BLOOD PRESSURE: 110 MMHG | TEMPERATURE: 98 F | WEIGHT: 139.31 LBS

## 2025-05-08 NOTE — DISCHARGE SUMMARY
[facility] - Med Surg  Discharge Summary      Admit Date: 2025    Discharge Date and Time: 2025  9:15 AM    Attending Physician: No att. providers found     Reason for Admission: Surgery    Procedures Performed: Procedure(s) (LRB):  MASTECTOMY, SIMPLE / Left / Combo case with Dr. Alcantara (Left)  BIOPSY, LYMPH NODE, SENTINEL / Left / MagTrace vs NUC Med Blue Dye SentiMag if needed (Left)  INSERTION, BREAST IMPLANT / LEFT (Left)    Hospital Course (synopsis of major diagnoses, care, treatment, and services provided during the course of the hospital stay): Patient underwent above procedure and was discharged home on pod#1  in stable condition. See progress note for full detail.           Final Diagnoses:    Principal Problem: <principal problem not specified>       Discharged Condition: good    Disposition: Home or Self Care    Follow Up/Patient Instructions:     Medications:  Reconciled Home Medications:      Medication List        ASK your doctor about these medications      co-enzyme Q-10 30 mg capsule  Take 30 mg by mouth 3 (three) times daily.     DENTA 5000 PLUS 1.1 % Crea  Generic drug: fluoride (sodium)  Take by mouth 2 (two) times daily.     ESTROVEN ORAL  Take by mouth Daily.     fluocinonide 0.05 % external solution  Commonly known as: LIDEX  SMARTSI-2 Milliliter(s) Topical Twice Daily PRN     * ketoconazole 2 % shampoo  Commonly known as: NIZORAL  Apply topically twice a week.     * ketoconazole 2 % cream  Commonly known as: NIZORAL  Apply topically.     magnesium oxide 400 mg (241.3 mg magnesium) tablet  Commonly known as: MAG-OX  Take 400 mg by mouth once daily.     rosuvastatin 10 MG tablet  Commonly known as: CRESTOR  Take 1 tablet (10 mg total) by mouth once daily.     TUMERIC-GING-OLIVE-OREG-CAPRYL ORAL  Take by mouth.     VITAMIN B-1 100 MG tablet  Generic drug: thiamine  Take 100 mg by mouth once daily.     VITAMIN B-12 100 MCG tablet  Generic drug: cyanocobalamin  Take 100  mcg by mouth once daily.     vitamin D 1000 units Tab  Commonly known as: VITAMIN D3  Take 1,000 Units by mouth once daily.           * This list has 2 medication(s) that are the same as other medications prescribed for you. Read the directions carefully, and ask your doctor or other care provider to review them with you.                Discharge Procedure Orders   Diet Adult Regular     Lifting restrictions   Order Comments: No lifting greater than 10 lbs     Change dressing   Order Comments: Dressing: keep ABD pads and gauze in bra, Keep clear tape in place. Do not remove.   RIGOBERTO drain care: strip tube every 4 hours, record output and record every 4 hours     Notify your health care provider if you experience any of the following:  temperature >100.4     Notify your health care provider if you experience any of the following:  severe uncontrolled pain     Notify your health care provider if you experience any of the following:  redness, tenderness, or signs of infection (pain, swelling, redness, odor or green/yellow discharge around incision site)     Notify your health care provider if you experience any of the following:  difficulty breathing or increased cough     Notify your health care provider if you experience any of the following:  severe persistent headache     Notify your health care provider if you experience any of the following:  worsening rash     Notify your health care provider if you experience any of the following:  persistent dizziness, light-headedness, or visual disturbances     Notify your health care provider if you experience any of the following:   Order Comments: Bloody RIGOBERTO out put     Shower on day dressing removed (No bath)   Order Comments: Ok to shower tonight, then daily.      Follow-up Information       Maria Antonia Mancuso PA Follow up on 5/14/2025.    Specialties: General Surgery, Breast Surgery  Why: @ 11:20 am  Contact information:  [address] [place] [address]  [address]  [place]  [address]  [phone]               Kierra Alcantara MD Follow up on 5/13/2025.    Specialty: Plastic Surgery  Why: @ 12:15 pm  Contact information:  [address]  [facility] [address]  [facility] [address]  [phone]

## 2025-05-12 ENCOUNTER — RESULTS FOLLOW-UP (OUTPATIENT)
Dept: SURGERY | Facility: CLINIC | Age: 75
End: 2025-05-12

## 2025-05-12 LAB — PSYCHE PATHOLOGY RESULT: NORMAL

## 2025-05-13 NOTE — PROGRESS NOTES
Please call the patient and inform pathology results revealed the cancer was completely removed and all lymph nodes were negative for cancer. Further discussion will be had at their post-op/follow-up appointment.

## 2025-05-14 ENCOUNTER — OFFICE VISIT (OUTPATIENT)
Dept: SURGERY | Facility: CLINIC | Age: 75
End: 2025-05-14
Payer: MEDICARE

## 2025-05-14 VITALS
TEMPERATURE: 98 F | WEIGHT: 140.81 LBS | OXYGEN SATURATION: 95 % | BODY MASS INDEX: 25.91 KG/M2 | DIASTOLIC BLOOD PRESSURE: 78 MMHG | RESPIRATION RATE: 18 BRPM | HEIGHT: 62 IN | SYSTOLIC BLOOD PRESSURE: 111 MMHG | HEART RATE: 85 BPM

## 2025-05-14 DIAGNOSIS — Z17.0 MALIGNANT NEOPLASM OF UPPER-OUTER QUADRANT OF LEFT BREAST IN FEMALE, ESTROGEN RECEPTOR POSITIVE: Primary | ICD-10-CM

## 2025-05-14 DIAGNOSIS — C50.412 MALIGNANT NEOPLASM OF UPPER-OUTER QUADRANT OF LEFT BREAST IN FEMALE, ESTROGEN RECEPTOR POSITIVE: Primary | ICD-10-CM

## 2025-05-14 DIAGNOSIS — D05.12 DUCTAL CARCINOMA IN SITU (DCIS) OF LEFT BREAST: ICD-10-CM

## 2025-05-14 PROCEDURE — 99214 OFFICE O/P EST MOD 30 MIN: CPT | Mod: PBBFAC | Performed by: PHYSICIAN ASSISTANT

## 2025-05-14 PROCEDURE — 99024 POSTOP FOLLOW-UP VISIT: CPT | Mod: POP,,, | Performed by: PHYSICIAN ASSISTANT

## 2025-05-14 PROCEDURE — 99999 PR PBB SHADOW E&M-EST. PATIENT-LVL IV: CPT | Mod: PBBFAC,,, | Performed by: PHYSICIAN ASSISTANT

## 2025-05-14 RX ORDER — TRAMADOL HYDROCHLORIDE 50 MG/1
50 TABLET, FILM COATED ORAL 4 TIMES DAILY PRN
COMMUNITY
Start: 2025-05-02

## 2025-05-14 RX ORDER — NAPROXEN 500 MG/1
500 TABLET ORAL 2 TIMES DAILY PRN
COMMUNITY
Start: 2025-05-02

## 2025-05-14 RX ORDER — DOXYCYCLINE 100 MG/1
100 CAPSULE ORAL 2 TIMES DAILY
COMMUNITY
Start: 2025-05-02

## 2025-05-14 RX ORDER — MUPIROCIN 20 MG/G
OINTMENT TOPICAL 2 TIMES DAILY
COMMUNITY
Start: 2025-05-02

## 2025-05-14 RX ORDER — DOCUSATE SODIUM 100 MG/1
100 CAPSULE ORAL 2 TIMES DAILY
COMMUNITY
Start: 2025-05-02

## 2025-05-14 NOTE — PROGRESS NOTES
[facility] - Breast [place] [facility]  Breast Surgical Oncology  Est Patient Office Visit - H&P      Referring Provider: No ref. provider found   PCP: Marco Antonio Amrstrong MD     Patient Care Team:  Marco Antonio Armstrong MD as PCP - General (Family Medicine)  Lila Ayala MD as Consulting Physician (Gynecology)  [facility] as Consulting Physician (Gastroenterology)  Nereyda Tim MD as Consulting Physician (Breast Surgery)  Maria Antonia Mancuso PA as Physician Assistant (General Surgery)  Kierra Alcantara MD as Consulting Physician (Plastic Surgery)      Chief Complaint:   Chief Complaint   Patient presents with    Post-op Evaluation     Patient c/o constant discomfort and burning sensation at the mastectomy site, bruising to report as well         Subjective:     Treatment History:  5/5/2025 Left Nipple sparing mastectomy and SLNB    Interval History:  05/14/2025 - Elisha Reynoso is here today for post op. Following with plastics for post op management. She has been referred to [facility] wound care for hyperbarics due to concern for nipple healing.    HPI:  Elisha Reynoso is a 74 y.o. female who presents on 5/14/2025 for evaluation of newly diagnosed left  breast cancer. Pt has a history of breast cancer diagnosed in 2001.    A detailed patient history was obtained and reviewed. She currently denies any breast issues including rashes, redness, pain, swelling, nipple discharge, or new lumps/masses.    I have reviewed: history from someone besides the patient, notes from other physicians, assessment of correct protocol, review of exclusion and inclusion criteria into clinical management protocol , surgical pathology results, and radiographic study evaluation      MG breast density: Category B - Scattered area of glandular/fibroglandular tissue    Imaging:  3/18/2025  BL SC MG at [facility]  IMPRESSION: NEEDS ADDITIONAL IMAGING  There  ia a 0.5 cm round  focal asymmetry in the central retroareolar area of the L breast.    3/13/2025  L US BREAST COMPLETE AND AXILLA at [facility]  IMPRESSION: SUSPICIOUS FOR MALIGNANCY  There is a multilobulated mass with a smooth thin hypoechoic margin located in L breast at 12 o'clock 2 cm FN which appears to be an enlarged duct containing heterogeneous isoechoic and hypoechoic substance, possibly a solid mass.It measures 0.8 x 0.8 x 1.9 cm. There is no other significant findings.  The surgical scar is seen nearby at 1 o'clock 3 cm FN which has a stable benign appearance.          Oncology History   Malignant neoplasm of upper-outer quadrant of left breast in female, estrogen receptor positive          Pathology:  3/18/2025  Ultrasound-guided Core Needle Biopsy Left Breast 12 o'clock 2 cm FN  -Invasive ductal carcinoma, grade 2 (measuring 0.3 cm)  -Ductal carcinoma in situ, cribriform pattern, grade 2 with focal necrosis  ER 87%  MO 0%  HER 2 maryuri 1+  Ki67 41.7%    2025   Left nipple sparing mastectomy and SLNB  Solid papillary carcinoma with invasion.  Overall grade 2.  Tumor measures a 4 mm.  DCIS also present, measuring 16 mm in greatest extent.  Lymphovascular invasion is not identified.  All margins are negative for invasive and noninvasive carcinoma.  To left axillary sentinel lymph nodes are negative for metastatic carcinoma.  Incidental node found in the breast is also negative.    OB/GYN History:  Age at Menarche Onset: 10    Menopausal Status: postmenopausal, LMP: No LMP recorded. Patient has had a hysterectomy.  Hysterectomy/Oophorectomy: hysterectomy with BSO, at age 51     Hormonal birth control (duration): Yes OCP (estrogen/progesterone).   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)    Other Relevant History:  Prior thoracic RT: none  Genetic testing: None  [employer]ken[employer] descent:  No    Family History:  Family History   Problem Relation Name Age of Onset    Breast cancer Mother  60 - 65    Lung cancer Father  64        Past History:  Past Medical History:   Diagnosis Date    Breast cancer     Hyperlipidemia     Skin cancer         Past Surgical History:   Procedure Laterality Date    BREAST SURGERY Left     lumpectomy 2001    CATARACT EXTRACTION  07/2023    COLONOSCOPY  02/25/2015    Tommy Sharma MD    HYSTERECTOMY      INSERTION OF BREAST IMPLANT Left 5/5/2025    Procedure: INSERTION, BREAST IMPLANT / LEFT;  Surgeon: Kierra Alcantara MD;  Location: [facility] OR;  Service: Plastics;  Laterality: Left;    SENTINEL LYMPH NODE BIOPSY Left 5/5/2025    Procedure: BIOPSY, LYMPH NODE, SENTINEL / Left / MagTrace vs NUC Med Blue Dye SentiMag if needed;  Surgeon: Nereyda Tim MD;  Location: [facility] OR;  Service: General;  Laterality: Left;    SIMPLE MASTECTOMY Left 5/5/2025    Procedure: MASTECTOMY, SIMPLE / Left / Combo case with Dr. Alcantara;  Surgeon: Nereyda Tim MD;  Location: [facility];  Service: General;  Laterality: Left;  nipple-sparing    SKIN CANCER EXCISION          Social History[1]     Body mass index is 25.75 kg/m².     Allergy/Medications:   Review of patient's allergies indicates:  No Known Allergies     Current Medications[2]         Review of Systems:  Review of Systems   Constitutional:  Negative for chills, fever and weight loss.   HENT:  Positive for sinus pain.    Eyes:  Negative for blurred vision and double vision.   Respiratory:  Negative for cough and shortness of breath.    Cardiovascular:  Negative for chest pain, palpitations and leg swelling.   Gastrointestinal:  Positive for constipation and heartburn. Negative for abdominal pain, diarrhea, nausea and vomiting.   Genitourinary:  Positive for frequency and urgency. Negative for dysuria, flank pain and hematuria.   Musculoskeletal:  Negative for back pain, joint pain and myalgias.   Neurological:  Negative for  "dizziness and headaches.   Endo/Heme/Allergies:  Does not bruise/bleed easily.   Psychiatric/Behavioral:  Negative for depression. The patient is not nervous/anxious.           Objective:     Vitals:  Blood pressure 111/78, pulse 85, temperature 97.5 °F (36.4 °C), temperature source Oral, resp. rate 18, height 5' 2" (1.575 m), weight 63.9 kg (140 lb 12.8 oz), SpO2 95%.      Physical Exam:  General: The patient is awake, alert and oriented times three. The patient is well nourished and in no acute distress.    Musculoskeletal: The patient has a normal range of motion of her bilateral upper extremities.    Breast: The incision is healing well. No tenderness, swelling, or redness. Ecchymosis present, most notable to the NAC. RIGOBERTO drain in place with serosanguinous fluid in the bulb. Tissue expander present.  Integumentary: no rashes or skin lesions present  Neurologic: cranial nerves intact, no signs of peripheral neurological deficit, motor/sensory function intact    Physical Exam  Chest:              Assessment and Discussion:      Cancer Staging   Malignant neoplasm of upper-outer quadrant of left breast in female, estrogen receptor positive  Staging form: Breast, AJCC 8th Edition  - Clinical stage from 4/1/2025: Stage IA (cT1c, cN0, cM0, G2, ER+, NJ-, HER2-) - Signed by Nereyda Tim MD on 4/2/2025  - Pathologic stage from 5/14/2025: Stage IA (pT1a, pN0(sn), cM0, G2, ER+, NJ-, HER2-) - Signed by Maria Antonia Mancuso PA on 5/14/2025        Encounter Diagnoses   Name Primary?    Malignant neoplasm of upper-outer quadrant of left breast in female, estrogen receptor positive Yes    Ductal carcinoma in situ (DCIS) of left breast       Pathology discussed in detail. She is doing well post op. Following closely with plastic surgery to monitor nipple healing.      Plan:     Recommend referral to medical oncologist, Dr. Linda Chan.    Continue f/u with plastic surgery for reconstruction.    RTC in 3 mo with Dr." Meeta.      CC: Dr. Alcantara      All of her questions were answered. She was advised to call if she develops any questions or concerns.    Maria Antonia Mancuso PA-C         Total time on the date of the visit ranged from 30-39 mins (18259). Total time includes both face-to-face and non-face-to-face time personally spent by myself on the day of the visit.    Non-face-to-face time included:  _X_ preparing to see the patient such as reviewing the patient record  __ obtaining and reviewing separately obtained history  _X_ independently interpreting results  _X_ documenting clinical information in electronic health record.    Face-to-face time included:  _X_ performing an appropriate history and examination  _X_ communicating results to the patient  _X_ counseling and educating the patient  __ ordering appropriate medications  __ ordering appropriate tests  _X_ ordering appropriate procedures (including follow-up)  _X_ answering any questions the patient had    Total Time spent on date of visit: 35 minutes         [1]   Social History  Socioeconomic History    Marital status:    Tobacco Use    Smoking status: Never     Passive exposure: Never    Smokeless tobacco: Never   Substance and Sexual Activity    Alcohol use: Never    Drug use: Never   [2]   Current Outpatient Medications:     co-enzyme Q-10 30 mg capsule, Take 30 mg by mouth 3 (three) times daily., Disp: , Rfl:     COLACE 100 mg capsule, Take 100 mg by mouth 2 (two) times daily., Disp: , Rfl:     cyanocobalamin (VITAMIN B-12) 100 MCG tablet, Take 100 mcg by mouth once daily., Disp: , Rfl:     DENTA 5000 PLUS 1.1 % Crea, Take by mouth 2 (two) times daily., Disp: , Rfl:     doxycycline (VIBRAMYCIN) 100 MG Cap, Take 100 mg by mouth 2 (two) times daily., Disp: , Rfl:     fluocinonide (LIDEX) 0.05 % external solution, SMARTSI-2 Milliliter(s) Topical Twice Daily PRN, Disp: , Rfl:     ketoconazole (NIZORAL) 2 % cream, Apply topically., Disp: , Rfl:      ketoconazole (NIZORAL) 2 % shampoo, Apply topically twice a week., Disp: , Rfl:     magnesium oxide (MAG-OX) 400 mg (241.3 mg magnesium) tablet, Take 400 mg by mouth once daily., Disp: , Rfl:     mupirocin (BACTROBAN) 2 % ointment, Apply topically 2 (two) times daily., Disp: , Rfl:     naproxen (NAPROSYN) 500 MG tablet, Take 500 mg by mouth 2 (two) times daily as needed., Disp: , Rfl:     rosuvastatin (CRESTOR) 10 MG tablet, Take 1 tablet (10 mg total) by mouth once daily., Disp: 90 tablet, Rfl: 3    soy isofla/blk cohosh/mag bark (ESTROVEN ORAL), Take by mouth Daily., Disp: , Rfl:     thiamine (VITAMIN B-1) 100 MG tablet, Take 100 mg by mouth once daily., Disp: , Rfl:     traMADoL (ULTRAM) 50 mg tablet, Take 50 mg by mouth 4 (four) times daily as needed., Disp: , Rfl:     TUMERIC-GING-OLIVE-OREG-CAPRYL ORAL, Take by mouth., Disp: , Rfl:     vitamin D (VITAMIN D3) 1000 units Tab, Take 1,000 Units by mouth once daily., Disp: , Rfl:

## 2025-06-02 NOTE — PROGRESS NOTES
Subjective:       Patient ID: Elisha Reynoso is a 74 y.o. female.    Chief Complaint:  Newly diagnosed breast cancer    Diagnosis:  Stage IA left breast cancer - 0.4 cm,GII, ER+/IN neg, HER2 neg (IHC 1+)                     History of early stage left breast cancer 2001                     Postmenopausal s/p hysterectomy    Current Treatment:  New patient visit    Clinical History:  Patient has a history of stage I left breast cancer in 2001 treated with lumpectomy followed by adjuvant radiation therapy and tamoxifen for 5 years.  She was previously evaluated in my office in 2017 for mild asymptomatic leukopenia.  She had an abnormal screening mammogram 2/18/25 ([facility]) showing a 0.5 cm focal asymmetry in the central retroareolar left breast.  Ultrasound 3/13/25 was suspicious for malignancy.  Ultrasound-guided biopsy 3/18/25:  Infiltrating ductal carcinoma, grade 2 and DCIS.  ER +67%, IN 0, HER2 negative (IHC 1+) and Ki-67 41.7%.  Genetic testing 4/1/25 by [facility] was negative for all 70 tested mutations.  On 5/5/25, she underwent a left nipple sparing mastectomy and sentinel lymph node biopsy with placement of a left breast implant.  Final pathology showed a solid papillary carcinoma with invasion measuring 4 mm, grade 2 with no lymphovascular invasion.  An incidental intramammary lymph node was negative and 2 sentinel lymph nodes were negative.      She presents today with her  for a medical oncology evaluation.  She had some necrosis involving the left nipple following her mastectomy and reconstruction.  She is currently doing hyperbaric therapy with good results.  She is not having any significant pain or drainage.  She has a small eschar in the left nipple region.  Bone density exam 2/18/25 showed stable osteoporosis of the lumbar spine with a T-score of -2.6 and osteopenia of both hips with a T-score of -2.0 on the left and -2.2 on the right.  She was previously taking weekly  "Fosamax.  It was held in 2023 secondary to dental work.      Interval History  New patient visit    Review of Systems   Constitutional:  Negative for appetite change, fatigue, fever and unexpected weight change.   HENT:  Negative for mouth sores, sore throat and trouble swallowing.    Eyes: Negative.    Respiratory:  Negative for cough and shortness of breath.    Cardiovascular:  Negative for chest pain, palpitations and leg swelling.   Gastrointestinal:  Negative for abdominal distention, abdominal pain, constipation, diarrhea and nausea.   Genitourinary:  Negative for dysuria, frequency and urgency.   Musculoskeletal:  Positive for arthralgias. Negative for back pain.   Integumentary:  Positive for wound (left nipple). Negative for rash.   Neurological:  Negative for dizziness, weakness, numbness and headaches.   Hematological:  Negative for adenopathy. Does not bruise/bleed easily.   Psychiatric/Behavioral: Negative.         PMHx:  HLD, breast cancer  Menarche age 10, 1st pregnancy 14, 3 children (did not breastfeed), menopause 51, hysterectomy early 60's, no HRT  PSHx:  Tonsils, left breast lumpectomy, hysterectomy/BSO, cataracts, left mastectomy with reconstruction  SH:  Lifetime nonsmoker, no significant alcohol use.  Lives in [place] with her .  FH:  Her mother had breast cancer and father had lung cancer.    Objective:        /84 (BP Location: Right arm, Patient Position: Sitting)   Pulse 74   Temp 98.1 °F (36.7 °C) (Oral)   Resp 14   Ht 5' 2" (1.575 m)   Wt 64.6 kg (142 lb 8 oz)   SpO2 96%   BMI 26.06 kg/m²    Physical Exam  Constitutional:       Comments: Elderly, well-developed WF in NAD   HENT:      Head: Normocephalic.      Mouth/Throat:      Mouth: Mucous membranes are moist.      Pharynx: Oropharynx is clear. No posterior oropharyngeal erythema.   Eyes:      Extraocular Movements: Extraocular movements intact.      Conjunctiva/sclera: Conjunctivae normal.      Pupils: Pupils " are equal, round, and reactive to light.   Cardiovascular:      Rate and Rhythm: Normal rate and regular rhythm.      Heart sounds: No murmur heard.  Pulmonary:      Comments: Lungs clear to auscultation  Chest:      Comments: Left nipple sparing mastectomy with implant reconstruction.  2 cm eschar involving nipple.  No palpable masses or tenderness.  Right breast without masses or skin changes.  No axillary nodes bilaterally.  Abdominal:      General: Bowel sounds are normal. There is no distension.      Palpations: Abdomen is soft. There is no mass.      Tenderness: There is no abdominal tenderness.   Musculoskeletal:         General: No swelling or tenderness. Normal range of motion.      Cervical back: Neck supple. No tenderness.   Lymphadenopathy:      Cervical: No cervical adenopathy.   Skin:     General: Skin is warm and dry.      Findings: No rash.   Neurological:      General: No focal deficit present.      Mental Status: She is alert and oriented to person, place, and time.      Cranial Nerves: No cranial nerve deficit.      Motor: No weakness.       ECOG SCORE    0 - Fully active-able to carry on all pre-disease performance without restriction          LABORATORY  No results found for this or any previous visit (from the past week).         Assessment:   Stage IA left breast cancer (T1b N0 M0)  History of early stage left breast cancer 2001  Osteopenia/osteoporosis  Postmenopausal s/p hysterectomy      Plan:   The diagnosis, prognosis and treatment options for early stage breast cancer with her pathologic profile were reviewed and discussed with the patient and her  over the course of a 1 hour office visit.  The NCCN guidelines for treatment of breast cancer were also reviewed and discussed.  Her case was presented in multidisciplinary Breast Tumor Board 5/14/25 with recommendations for adjuvant hormonal therapy alone.  Treatment was recommended with Letrozole 2.5 mg daily for a minimum duration of  5 years.  Benefits, risks, toxicities and side effects of Letrozole were discussed and reviewed in detail. All questions were answered. Literature regarding the treatment plan and specific drug information was also provided.  Continue follow-up with breast surgery, wound care and hyperbarics.  RTC in 8 weeks for a follow-up visit and clinical assessment to evaluate tolerance of treatment.      GREGORY MORLEY MD    Other Physicians  Lila Pérez Dr[address] Dr

## 2025-06-13 ENCOUNTER — TELEPHONE (OUTPATIENT)
Dept: HEMATOLOGY/ONCOLOGY | Facility: CLINIC | Age: 75
End: 2025-06-13
Payer: MEDICARE

## 2025-06-13 NOTE — TELEPHONE ENCOUNTER
Spoke with patient regarding new patient appointment on 6/26/25 with Dr. Forbes. Patient denied concerns or questions at this time. Confirmed appointment date, time and location with patient.

## 2025-06-16 ENCOUNTER — OFFICE VISIT (OUTPATIENT)
Dept: HEMATOLOGY/ONCOLOGY | Facility: CLINIC | Age: 75
End: 2025-06-16
Payer: MEDICARE

## 2025-06-16 VITALS
HEIGHT: 62 IN | BODY MASS INDEX: 26.22 KG/M2 | HEART RATE: 74 BPM | RESPIRATION RATE: 14 BRPM | OXYGEN SATURATION: 96 % | TEMPERATURE: 98 F | SYSTOLIC BLOOD PRESSURE: 131 MMHG | WEIGHT: 142.5 LBS | DIASTOLIC BLOOD PRESSURE: 84 MMHG

## 2025-06-16 DIAGNOSIS — Z76.89 ENCOUNTER TO ESTABLISH CARE WITH NEW PROVIDER: ICD-10-CM

## 2025-06-16 DIAGNOSIS — C50.112 MALIGNANT NEOPLASM OF CENTRAL PORTION OF LEFT BREAST IN FEMALE, ESTROGEN RECEPTOR POSITIVE: Primary | ICD-10-CM

## 2025-06-16 DIAGNOSIS — Z17.0 MALIGNANT NEOPLASM OF CENTRAL PORTION OF LEFT BREAST IN FEMALE, ESTROGEN RECEPTOR POSITIVE: Primary | ICD-10-CM

## 2025-06-16 PROCEDURE — 99999 PR PBB SHADOW E&M-EST. PATIENT-LVL V: CPT | Mod: PBBFAC,,, | Performed by: INTERNAL MEDICINE

## 2025-06-16 PROCEDURE — 99215 OFFICE O/P EST HI 40 MIN: CPT | Mod: PBBFAC | Performed by: INTERNAL MEDICINE

## 2025-06-16 RX ORDER — HONEY 100 %
PASTE (ML) TOPICAL
COMMUNITY
Start: 2025-05-30

## 2025-06-16 RX ORDER — LETROZOLE 2.5 MG/1
2.5 TABLET, FILM COATED ORAL DAILY
Qty: 30 TABLET | Refills: 6 | Status: SHIPPED | OUTPATIENT
Start: 2025-06-16 | End: 2026-06-16

## 2025-08-13 ENCOUNTER — OFFICE VISIT (OUTPATIENT)
Dept: HEMATOLOGY/ONCOLOGY | Facility: CLINIC | Age: 75
End: 2025-08-13
Payer: MEDICARE

## 2025-08-13 VITALS
TEMPERATURE: 98 F | OXYGEN SATURATION: 98 % | HEART RATE: 68 BPM | BODY MASS INDEX: 26.93 KG/M2 | RESPIRATION RATE: 15 BRPM | HEIGHT: 62 IN | SYSTOLIC BLOOD PRESSURE: 118 MMHG | WEIGHT: 146.31 LBS | DIASTOLIC BLOOD PRESSURE: 87 MMHG

## 2025-08-13 DIAGNOSIS — M81.0 AGE-RELATED OSTEOPOROSIS WITHOUT CURRENT PATHOLOGICAL FRACTURE: ICD-10-CM

## 2025-08-13 DIAGNOSIS — Z17.0 MALIGNANT NEOPLASM OF CENTRAL PORTION OF LEFT BREAST IN FEMALE, ESTROGEN RECEPTOR POSITIVE: Primary | ICD-10-CM

## 2025-08-13 DIAGNOSIS — C50.112 MALIGNANT NEOPLASM OF CENTRAL PORTION OF LEFT BREAST IN FEMALE, ESTROGEN RECEPTOR POSITIVE: Primary | ICD-10-CM

## 2025-08-13 PROCEDURE — 99214 OFFICE O/P EST MOD 30 MIN: CPT | Mod: PBBFAC | Performed by: INTERNAL MEDICINE

## 2025-08-13 PROCEDURE — 99999 PR PBB SHADOW E&M-EST. PATIENT-LVL IV: CPT | Mod: PBBFAC,,, | Performed by: INTERNAL MEDICINE

## 2025-08-13 RX ORDER — ALENDRONATE SODIUM 70 MG/1
70 TABLET ORAL
Qty: 4 TABLET | Refills: 11 | Status: SHIPPED | OUTPATIENT
Start: 2025-08-13 | End: 2026-08-13

## 2025-08-19 ENCOUNTER — OFFICE VISIT (OUTPATIENT)
Dept: SURGERY | Facility: CLINIC | Age: 75
End: 2025-08-19
Payer: MEDICARE

## 2025-08-19 VITALS
HEIGHT: 62 IN | OXYGEN SATURATION: 95 % | DIASTOLIC BLOOD PRESSURE: 81 MMHG | SYSTOLIC BLOOD PRESSURE: 124 MMHG | HEART RATE: 70 BPM | TEMPERATURE: 98 F | RESPIRATION RATE: 18 BRPM | BODY MASS INDEX: 26.13 KG/M2 | WEIGHT: 142 LBS

## 2025-08-19 DIAGNOSIS — Z85.3 HISTORY OF BREAST CANCER: Primary | ICD-10-CM

## 2025-08-19 DIAGNOSIS — L90.5 SCAR CONTRACTURE: ICD-10-CM

## 2025-08-19 DIAGNOSIS — Z17.0 MALIGNANT NEOPLASM OF UPPER-OUTER QUADRANT OF LEFT BREAST IN FEMALE, ESTROGEN RECEPTOR POSITIVE: ICD-10-CM

## 2025-08-19 DIAGNOSIS — C50.412 MALIGNANT NEOPLASM OF UPPER-OUTER QUADRANT OF LEFT BREAST IN FEMALE, ESTROGEN RECEPTOR POSITIVE: ICD-10-CM

## 2025-08-19 PROCEDURE — 99213 OFFICE O/P EST LOW 20 MIN: CPT | Mod: S$PBB,,, | Performed by: SURGERY

## 2025-08-19 PROCEDURE — 99214 OFFICE O/P EST MOD 30 MIN: CPT | Mod: PBBFAC | Performed by: SURGERY

## 2025-08-19 PROCEDURE — 99999 PR PBB SHADOW E&M-EST. PATIENT-LVL IV: CPT | Mod: PBBFAC,,, | Performed by: SURGERY

## 2025-08-25 DIAGNOSIS — Z12.31 SCREENING MAMMOGRAM FOR BREAST CANCER: Primary | ICD-10-CM

## 2025-08-25 DIAGNOSIS — Z85.3 HISTORY OF BREAST CANCER: ICD-10-CM

## (undated) DEVICE — DRAIN SIL RND HUBLSS 19F TRCR

## (undated) DEVICE — CONTAINER SPECIMEN SCREW 4OZ

## (undated) DEVICE — BLADE SURG STAINLESS STEEL #10

## (undated) DEVICE — SOL NACL IRR 1000ML BTL

## (undated) DEVICE — SOL NORMAL USPCA 0.9%

## (undated) DEVICE — SUPPORT ULNA NERVE PROTECTOR

## (undated) DEVICE — ELECTRODE BLADE INSULATED 1 IN

## (undated) DEVICE — SPONGE LAP 18X18 PREWASHED

## (undated) DEVICE — ILLUMINATOR PHOTONBLADE 8/11IN

## (undated) DEVICE — SYR IRRIGATION BULB STER 60ML

## (undated) DEVICE — GAUZE DRAIN N WVN 6PLY 4X4IN

## (undated) DEVICE — GLOVE SENSICARE PI GRN 6.5

## (undated) DEVICE — SUT ETHILON 3-0 FS-1 30

## (undated) DEVICE — Device

## (undated) DEVICE — GLOVE SENSICARE PI SURG 6

## (undated) DEVICE — DRAPE ULTRASOUND PROBE HEAD

## (undated) DEVICE — BOWL STERILE LG GRAD 32OZ

## (undated) DEVICE — GLOVE 6.0 PROTEXIS PI MICRO

## (undated) DEVICE — STAPLER SKIN PROXIMATE WIDE

## (undated) DEVICE — BLADE SURG STAINLESS STEEL #15

## (undated) DEVICE — SUT ABSRB PDS II 3-0 FS-2 27IN

## (undated) DEVICE — NDL SAFETY 22G X 1.5 ECLIPSE

## (undated) DEVICE — SOL IRRI STRL WATER 1000ML

## (undated) DEVICE — TRAY CATH FOL SIL DRN BAG 16FR

## (undated) DEVICE — PACK SPY-PHI DRUG DRAPE

## (undated) DEVICE — PROBE TRUNODE GAMMA HAND PIECE

## (undated) DEVICE — SET FLUID TRANSFER ASEPTIC

## (undated) DEVICE — SUT PDS PLUS 2-0 CT2 27IN

## (undated) DEVICE — ELECTRODE PATIENT RETURN DISP

## (undated) DEVICE — SYR 3ML LL 18GA 1.5IN

## (undated) DEVICE — GLOVE SENSICARE PI MICRO 6

## (undated) DEVICE — APPLIER CLIP LIAGCLIP 9.375IN

## (undated) DEVICE — DRAPE INCISE IOBAN 2 13X13IN

## (undated) DEVICE — GOWN POLY REINF BRTH SLV XL

## (undated) DEVICE — DRESSING TRANS 6X8 TEGADERM

## (undated) DEVICE — NDL HYPO REG 25G X 1 1/2

## (undated) DEVICE — DRESSING XEROFORM NONADH 1X8IN

## (undated) DEVICE — COVER PROXIMA MAYO STAND

## (undated) DEVICE — APPLICATOR CHLORAPREP ORN 26ML

## (undated) DEVICE — NDL BLNT STD 1.5IN 18G

## (undated) DEVICE — SUT MCRYL PLUS 3-0 PS2 27IN

## (undated) DEVICE — BLANKET WARMING LOWER BODY

## (undated) DEVICE — SUT 5-0 MONO P-3 18IN

## (undated) DEVICE — DRESSING CHG FOAM 4MM 1IN

## (undated) DEVICE — PAD PREP CUFFED NS 24X48IN

## (undated) DEVICE — SUT SILK 3-0 BLK BR SH 30IN

## (undated) DEVICE — PENCIL SMOKE EVAC TELSCP 15FT

## (undated) DEVICE — RESERVOIR JACKSON-PRATT 100CC

## (undated) DEVICE — NDL SYR 10ML 18X1.5 LL BLUNT

## (undated) DEVICE — FLEXFIT ULTRA-HIGH COVERAGE BRA

## (undated) DEVICE — PAD ABDOMINAL STERILE 8X10IN

## (undated) DEVICE — COUNT NDL FOAM MAGNET 40COUNT

## (undated) DEVICE — DRAPE MEDI-SLUSH 44X44IN

## (undated) DEVICE — WAVEGUIDE BRITEFIELD DISP

## (undated) DEVICE — SUT MCRYL PLUS 4-0 PS2 27IN